# Patient Record
Sex: MALE | URBAN - METROPOLITAN AREA
[De-identification: names, ages, dates, MRNs, and addresses within clinical notes are randomized per-mention and may not be internally consistent; named-entity substitution may affect disease eponyms.]

---

## 2017-06-15 ENCOUNTER — EMERGENCY (EMERGENCY)
Facility: HOSPITAL | Age: 57
LOS: 1 days | End: 2017-06-15
Payer: COMMERCIAL

## 2017-06-15 ENCOUNTER — INPATIENT (INPATIENT)
Facility: HOSPITAL | Age: 57
LOS: 6 days | Discharge: HOME | DRG: 25 | End: 2017-06-22
Attending: EMERGENCY MEDICINE | Admitting: EMERGENCY MEDICINE
Payer: COMMERCIAL

## 2017-06-15 VITALS
OXYGEN SATURATION: 97 % | DIASTOLIC BLOOD PRESSURE: 81 MMHG | RESPIRATION RATE: 12 BRPM | SYSTOLIC BLOOD PRESSURE: 131 MMHG | HEART RATE: 68 BPM

## 2017-06-15 DIAGNOSIS — D49.6 NEOPLASM OF UNSPECIFIED BEHAVIOR OF BRAIN: ICD-10-CM

## 2017-06-15 LAB
ALBUMIN SERPL ELPH-MCNC: 4 G/DL — SIGNIFICANT CHANGE UP (ref 3.3–5)
ALP SERPL-CCNC: 46 U/L — SIGNIFICANT CHANGE UP (ref 40–120)
ALT FLD-CCNC: 25 U/L RC — SIGNIFICANT CHANGE UP (ref 10–45)
ANION GAP SERPL CALC-SCNC: 15 MMOL/L — SIGNIFICANT CHANGE UP (ref 5–17)
APTT BLD: 27.1 SEC — LOW (ref 27.5–37.4)
AST SERPL-CCNC: 19 U/L — SIGNIFICANT CHANGE UP (ref 10–40)
BASOPHILS # BLD AUTO: 0 K/UL — SIGNIFICANT CHANGE UP (ref 0–0.2)
BASOPHILS NFR BLD AUTO: 0 % — SIGNIFICANT CHANGE UP (ref 0–2)
BILIRUB SERPL-MCNC: 0.6 MG/DL — SIGNIFICANT CHANGE UP (ref 0.2–1.2)
BLD GP AB SCN SERPL QL: NEGATIVE — SIGNIFICANT CHANGE UP
BUN SERPL-MCNC: 17 MG/DL — SIGNIFICANT CHANGE UP (ref 7–23)
CALCIUM SERPL-MCNC: 9 MG/DL — SIGNIFICANT CHANGE UP (ref 8.4–10.5)
CHLORIDE SERPL-SCNC: 108 MMOL/L — SIGNIFICANT CHANGE UP (ref 96–108)
CO2 SERPL-SCNC: 22 MMOL/L — SIGNIFICANT CHANGE UP (ref 22–31)
CREAT SERPL-MCNC: 0.93 MG/DL — SIGNIFICANT CHANGE UP (ref 0.5–1.3)
EOSINOPHIL # BLD AUTO: 0 K/UL — SIGNIFICANT CHANGE UP (ref 0–0.5)
EOSINOPHIL NFR BLD AUTO: 0.2 % — SIGNIFICANT CHANGE UP (ref 0–6)
GLUCOSE SERPL-MCNC: 140 MG/DL — HIGH (ref 70–99)
HCT VFR BLD CALC: 43.2 % — SIGNIFICANT CHANGE UP (ref 39–50)
HGB BLD-MCNC: 15.1 G/DL — SIGNIFICANT CHANGE UP (ref 13–17)
INR BLD: 1.1 RATIO — SIGNIFICANT CHANGE UP (ref 0.88–1.16)
LYMPHOCYTES # BLD AUTO: 1 K/UL — SIGNIFICANT CHANGE UP (ref 1–3.3)
LYMPHOCYTES # BLD AUTO: 13.1 % — SIGNIFICANT CHANGE UP (ref 13–44)
MCHC RBC-ENTMCNC: 31.2 PG — SIGNIFICANT CHANGE UP (ref 27–34)
MCHC RBC-ENTMCNC: 34.8 GM/DL — SIGNIFICANT CHANGE UP (ref 32–36)
MCV RBC AUTO: 89.6 FL — SIGNIFICANT CHANGE UP (ref 80–100)
MONOCYTES # BLD AUTO: 0.1 K/UL — SIGNIFICANT CHANGE UP (ref 0–0.9)
MONOCYTES NFR BLD AUTO: 1.9 % — LOW (ref 2–14)
NEUTROPHILS # BLD AUTO: 6.5 K/UL — SIGNIFICANT CHANGE UP (ref 1.8–7.4)
NEUTROPHILS NFR BLD AUTO: 84.7 % — HIGH (ref 43–77)
PLATELET # BLD AUTO: 255 K/UL — SIGNIFICANT CHANGE UP (ref 150–400)
POTASSIUM SERPL-MCNC: 4.1 MMOL/L — SIGNIFICANT CHANGE UP (ref 3.5–5.3)
POTASSIUM SERPL-SCNC: 4.1 MMOL/L — SIGNIFICANT CHANGE UP (ref 3.5–5.3)
PROT SERPL-MCNC: 7.1 G/DL — SIGNIFICANT CHANGE UP (ref 6–8.3)
PROTHROM AB SERPL-ACNC: 11.9 SEC — SIGNIFICANT CHANGE UP (ref 9.8–12.7)
RBC # BLD: 4.83 M/UL — SIGNIFICANT CHANGE UP (ref 4.2–5.8)
RBC # FLD: 12 % — SIGNIFICANT CHANGE UP (ref 10.3–14.5)
RH IG SCN BLD-IMP: POSITIVE — SIGNIFICANT CHANGE UP
SODIUM SERPL-SCNC: 145 MMOL/L — SIGNIFICANT CHANGE UP (ref 135–145)
WBC # BLD: 7.7 K/UL — SIGNIFICANT CHANGE UP (ref 3.8–10.5)
WBC # FLD AUTO: 7.7 K/UL — SIGNIFICANT CHANGE UP (ref 3.8–10.5)

## 2017-06-15 PROCEDURE — 74177 CT ABD & PELVIS W/CONTRAST: CPT | Mod: 26

## 2017-06-15 PROCEDURE — 71260 CT THORAX DX C+: CPT | Mod: 26

## 2017-06-15 PROCEDURE — 99285 EMERGENCY DEPT VISIT HI MDM: CPT | Mod: 25

## 2017-06-15 PROCEDURE — 70450 CT HEAD/BRAIN W/O DYE: CPT | Mod: 26

## 2017-06-15 PROCEDURE — 93010 ELECTROCARDIOGRAM REPORT: CPT | Mod: NC

## 2017-06-15 PROCEDURE — 99285 EMERGENCY DEPT VISIT HI MDM: CPT

## 2017-06-15 PROCEDURE — 71020: CPT | Mod: 26

## 2017-06-15 RX ORDER — ACETAMINOPHEN 500 MG
650 TABLET ORAL EVERY 6 HOURS
Qty: 0 | Refills: 0 | Status: DISCONTINUED | OUTPATIENT
Start: 2017-06-15 | End: 2017-06-19

## 2017-06-15 RX ORDER — ONDANSETRON 8 MG/1
4 TABLET, FILM COATED ORAL EVERY 6 HOURS
Qty: 0 | Refills: 0 | Status: DISCONTINUED | OUTPATIENT
Start: 2017-06-15 | End: 2017-06-19

## 2017-06-15 RX ORDER — DOCUSATE SODIUM 100 MG
100 CAPSULE ORAL THREE TIMES A DAY
Qty: 0 | Refills: 0 | Status: DISCONTINUED | OUTPATIENT
Start: 2017-06-15 | End: 2017-06-19

## 2017-06-15 RX ORDER — SENNA PLUS 8.6 MG/1
2 TABLET ORAL AT BEDTIME
Qty: 0 | Refills: 0 | Status: DISCONTINUED | OUTPATIENT
Start: 2017-06-15 | End: 2017-06-19

## 2017-06-15 RX ORDER — LEVETIRACETAM 250 MG/1
500 TABLET, FILM COATED ORAL EVERY 12 HOURS
Qty: 0 | Refills: 0 | Status: DISCONTINUED | OUTPATIENT
Start: 2017-06-15 | End: 2017-06-19

## 2017-06-15 RX ADMIN — LEVETIRACETAM 500 MILLIGRAM(S): 250 TABLET, FILM COATED ORAL at 23:06

## 2017-06-15 NOTE — ED PROVIDER NOTE - PHYSICAL EXAMINATION
NAD, NCAT, MMM, Trachea midline, Normal conjunctiva, CTAB, Non-tachycardic, Normal perfusion, Soft, NTND, No rebound/guarding, No edema, No deformity of extremities, Appropriate, Cooperative, With capacity and insight, No rashes,     CN 2-12 intact, normal coordination, normal finger to nose, normal heel to shin, Romberg, no pronator drift, gait instability, 5/5 strength in upper and lower extremities, normal sensory throughout, alert and oriented to person, place, time, and situation. NAD, NCAT, MMM, Trachea midline, Normal conjunctiva, CTAB, Non-tachycardic, Normal perfusion, Soft, NTND, No rebound/guarding, No edema, No deformity of extremities, Appropriate, Cooperative, With capacity and insight, No rashes, CN 2-12 intact, normal coordination, normal finger to nose, normal heel to shin, no pronator drift, no gait tested, Patient stands without difficulty, 5/5 strength in upper and lower extremities, normal sensory throughout, alert and oriented to person, place, time, and situation.     CN 2-12 intact, normal coordination, normal finger to nose, normal heel to shin, Romberg, no pronator drift, gait instability, 5/5 strength in upper and lower extremities, normal sensory throughout, alert and oriented to person, place, time, and situation.

## 2017-06-15 NOTE — ED ADULT NURSE NOTE - OBJECTIVE STATEMENT
57yr male transferred from [facility] with wife and daughter for AMS.  Pt came with 20g IV in LAC from [facility] and received 10mg decadron.  Pt was brought into hospital for confusion, wife reports this has been going on for a few weeks, went to hospital had CT scan and was diagnosed with R frontal mass.  Pt has a hx Crohn's Disease had small bowel resection 20yr ago. Pt presents a&o2, oriented to name and place, PERR, full ROM all extremities, no muscular deficits, no ha, no visual deficits, no tactile deficits, family reports Pt responds with inappropriate comments to questions and conversation, skin warm dry & intact. Pt transferred for Neuro Consult. 57yr male transferred from [facility] with wife and daughter for AMS.  Pt came with 20g IV in LAC from [facility] and received 10mg decadron.  Pt was brought into hospital for confusion, wife reports this has been going on for a few weeks, went to hospital had CT scan and was diagnosed with R frontal mass.  Pt has a hx Crohn's Disease had small bowel resection 20yr ago. Pt presents a&o2, oriented to name and place, PERR, full ROM all extremities, no muscular deficits, no ha, no visual deficits, no tactile deficits, family reports Pt responds with inappropriate comments to questions and conversation, has short term memory loss and personality change, skin warm dry & intact. Pt transferred for Neuro Consult.

## 2017-06-15 NOTE — ED PROVIDER NOTE - PROGRESS NOTE DETAILS
neurosurgery to follow MRI/A and CT c/a/p neurosurgery to follow MRI/A and CT c/a/p and request admission to their service, hold steroids per the neurosurgery team as may interfere with biopsy results if patient suffering with lymphoma

## 2017-06-15 NOTE — ED PROVIDER NOTE - OBJECTIVE STATEMENT
58 y/o male with PSHx of Small Bowel Resection presents to the ED c/o worsening short term memory loss and personality changes. Per family, pt has rapidly become forgetful. Reports he went to PMD who ordered routine blood work which was unremarkable. Per family, he has had inappropriate response to family member's passing. 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 Per family, pt has appointment with neurologist next month. Denies fever, chills, n/v/d.

## 2017-06-15 NOTE — H&P ADULT - HISTORY OF PRESENT ILLNESS
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 His symptoms seemed to worsen in the past few days, and he was let go from his job due to forgetfulness this week. He denies any HA, vision change, nausea/vomiting, focal weakness, seizrues, and gait abnormality and is full strength on exam w/ intact speech and repetition.     Exam  AAOx3, EOS, FC  Fluent speech w/ intact naming and repetition; unable to recall any of three objects after short interval  PERRL, EOMI  Face symmetric, tongue midline  JACQUES 5/5, no drift  SILT throughout

## 2017-06-15 NOTE — ED PROVIDER NOTE - MEDICAL DECISION MAKING DETAILS
Patient with ams and + new brain tumor. Will get iv, labs, mri/a head, will ct c/a/p and admit to neurosurgery

## 2017-06-15 NOTE — H&P ADULT - PROBLEM SELECTOR PLAN 1
admit to floor  q4 neuro checks  MRI brain +/-  CT CAP  keppra 500 BID  plan for OR pending imaging results

## 2017-06-15 NOTE — ED ADULT NURSE NOTE - CHPI ED SYMPTOMS NEG
no nausea/no fever/no weakness/no loss of consciousness/no blurred vision/no numbness/no change in level of consciousness/no vomiting

## 2017-06-16 DIAGNOSIS — G93.9 DISORDER OF BRAIN, UNSPECIFIED: ICD-10-CM

## 2017-06-16 DIAGNOSIS — E07.89 OTHER SPECIFIED DISORDERS OF THYROID: ICD-10-CM

## 2017-06-16 DIAGNOSIS — K76.0 FATTY (CHANGE OF) LIVER, NOT ELSEWHERE CLASSIFIED: ICD-10-CM

## 2017-06-16 DIAGNOSIS — K52.9 NONINFECTIVE GASTROENTERITIS AND COLITIS, UNSPECIFIED: ICD-10-CM

## 2017-06-16 DIAGNOSIS — T18.9XXA FOREIGN BODY OF ALIMENTARY TRACT, PART UNSPECIFIED, INITIAL ENCOUNTER: ICD-10-CM

## 2017-06-16 PROCEDURE — 74000: CPT | Mod: 26

## 2017-06-16 PROCEDURE — 70552 MRI BRAIN STEM W/DYE: CPT | Mod: 26

## 2017-06-16 PROCEDURE — 99223 1ST HOSP IP/OBS HIGH 75: CPT

## 2017-06-16 RX ORDER — CALCIUM CARBONATE 500(1250)
1 TABLET ORAL ONCE
Qty: 0 | Refills: 0 | Status: COMPLETED | OUTPATIENT
Start: 2017-06-16 | End: 2017-06-16

## 2017-06-16 RX ORDER — ENOXAPARIN SODIUM 100 MG/ML
40 INJECTION SUBCUTANEOUS AT BEDTIME
Qty: 0 | Refills: 0 | Status: DISCONTINUED | OUTPATIENT
Start: 2017-06-16 | End: 2017-06-18

## 2017-06-16 RX ADMIN — LEVETIRACETAM 500 MILLIGRAM(S): 250 TABLET, FILM COATED ORAL at 10:03

## 2017-06-16 RX ADMIN — Medication 100 MILLIGRAM(S): at 15:54

## 2017-06-16 RX ADMIN — Medication 100 MILLIGRAM(S): at 21:33

## 2017-06-16 RX ADMIN — Medication 1 TABLET(S): at 12:28

## 2017-06-16 RX ADMIN — LEVETIRACETAM 500 MILLIGRAM(S): 250 TABLET, FILM COATED ORAL at 21:33

## 2017-06-16 NOTE — PROGRESS NOTE ADULT - SUBJECTIVE AND OBJECTIVE BOX
SUBJECTIVE: Amb independently in hallway. No complaints    OVERNIGHT EVENTS: None    Vital Signs Last 24 Hrs  T(C): 36.4, Max: 36.8 (06-15 @ 19:00)  T(F): 97.5, Max: 98.3 (06-15 @ 19:00)  HR: 75 (65 - 84)  BP: 127/87 (120/76 - 153/71)  BP(mean): --  RR: 18 (12 - 18)  SpO2: 99% (95% - 99%)  IVF: [X ] IVL [ ] NS+K@   DIET: (X] Regular [ ] CCD [ ] Renal [ ] Puree [ ] Dysphagia [ ] Tube Feeds:   PCA: [ ] YES [X ] NO   ANNA: [ ] YES [X ] NO [ ] VOID   BM: [ ] YES [ ] NO     DRAINS: N/A    PHYSICAL EXAM:    General: No Acute Distress     Neurological: Awake. 0/3 recall. Able to name 3/3 object correctly. Speech clear. Following Commands, PERRL, EOMI, Face Symmetrical, Moving all extremities 5/5, Muscle Strength normal in all four extremities, No Drift, Sensation to Light Touch Intact    Pulmonary: Clear to Auscultation, No Rales, No Rhonchi, No Wheezes     Cardiovascular: S1, S2, Regular Rate and Rhythm     Gastrointestinal: Soft, Nontender, Nondistended     Extremities: No calf tenderness     Incision: N/A    LABS:                        15.1   7.7   )-----------( 255      ( 15 Osmar 2017 20:54 )             43.2    -15    145  |  108  |  17  ----------------------------<  140<H>  4.1   |  22  |  0.93    Ca    9.0      15 Osmar 2017 20:54    TPro  7.1  /  Alb  4.0  /  TBili  0.6  /  DBili  x   /  AST  19  /  ALT  25  /  AlkPhos  46  -15  PT/INR - ( 15 Osmar 2017 20:54 )   PT: 11.9 sec;   INR: 1.10 ratio         PTT - ( 15 Osmar 2017 20:54 )  PTT:27.1 sec      IMAGIN/15 CT CAP: No evidence of intrathoracic or intra-abdominal malignancy.    MEDICATIONS  (STANDING):  levETIRAcetam 500milliGRAM(s) Oral every 12 hours  docusate sodium 100milliGRAM(s) Oral three times a day    MEDICATIONS  (PRN):  acetaminophen   Tablet 650milliGRAM(s) Oral every 6 hours PRN For Temp greater than 38 C (100.4 F)  acetaminophen   Tablet. 650milliGRAM(s) Oral every 6 hours PRN Mild Pain (1 - 3)  ondansetron Injectable 4milliGRAM(s) IV Push every 6 hours PRN Nausea and/or Vomiting  senna 2Tablet(s) Oral at bedtime PRN Constipation

## 2017-06-16 NOTE — CONSULT NOTE ADULT - PROBLEM SELECTOR RECOMMENDATION 5
subcentimeter hypodense focus in left lobe of thyroid gland  recent TFT reportedly normal  outpatient follow up with possible thyroid U/S.

## 2017-06-16 NOTE — CONSULT NOTE ADULT - SUBJECTIVE AND OBJECTIVE BOX
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 Per spouse, patient has had changed in mental status for the past 10 months or so and was let go from his job. Patient's symptoms were getting progressively worse in the last couple of weeks and his spouse became concerned about his safety which prompted her to bring him to ER. Patient denies HA, changes in vision, dizziness, chest pain, SOB, ab pain, n/v, changes in bowel or urination.     PMD: Dr. Saud Stanley      PAST MEDICAL & SURGICAL HISTORY:      Review of Systems:   CONSTITUTIONAL: No fever, weight loss, or fatigue  HEENT: No sore throat, vision changes  RESPIRATORY: No cough, wheezing, shortness of breath  CARDIOVASCULAR: No chest pain, palpitations, leg edema  GASTROINTESTINAL: No abdominal pain. No nausea, vomiting, diarrhea or constipation. No melena or hematochezia.  GENITOURINARY: No dysuria, frequency, hematuria  NEUROLOGICAL: No headaches, memory loss, loss of strength, numbness, or tremors  SKIN: No itching, burning, rashes, or lesions   MUSCULOSKELETAL: No joint pain or swelling; No muscle, back, or extremity pain  PSYCHIATRIC: No depression, anxiety  HEME/LYMPH: No easy bruising, or bleeding gums      Allergies    No Known Allergies    Intolerances        Social History:     FAMILY HISTORY:      MEDICATIONS  (STANDING):  levETIRAcetam 500milliGRAM(s) Oral every 12 hours  docusate sodium 100milliGRAM(s) Oral three times a day  enoxaparin Injectable 40milliGRAM(s) SubCutaneous at bedtime    MEDICATIONS  (PRN):  acetaminophen   Tablet 650milliGRAM(s) Oral every 6 hours PRN For Temp greater than 38 C (100.4 F)  acetaminophen   Tablet. 650milliGRAM(s) Oral every 6 hours PRN Mild Pain (1 - 3)  ondansetron Injectable 4milliGRAM(s) IV Push every 6 hours PRN Nausea and/or Vomiting  senna 2Tablet(s) Oral at bedtime PRN Constipation      Vital Signs Last 24 Hrs  T(C): 36.8, Max: 36.8 (06-15 @ 19:00)  HR: 67 (65 - 84)  BP: 131/76 (120/76 - 153/71)  RR: 18 (12 - 18)  SpO2: 99% (95% - 99%)  Wt(kg): --  CAPILLARY BLOOD GLUCOSE    I&O's Summary    I & Os for current day (as of 16 Jun 2017 14:02)  =============================================  IN: 240 ml / OUT: 0 ml / NET: 240 ml      PHYSICAL EXAM:  GENERAL: NAD  HEENT: neck supple  LUNG: Clear to auscultation bilaterally; No wheeze  HEART: Regular rate and rhythm; No murmurs  ABDOMEN: Soft, Nontender, Nondistended; Bowel sounds present  EXTREMITIES: No leg edema  PSYCH: normal affect, calm  NEUROLOGY: AAO x3   SKIN: No rashes or lesions    LABS:                        15.1   7.7   )-----------( 255      ( 15 Osmar 2017 20:54 )             43.2     06-15    145  |  108  |  17  ----------------------------<  140<H>  4.1   |  22  |  0.93    Ca    9.0      15 Osmar 2017 20:54    TPro  7.1  /  Alb  4.0  /  TBili  0.6  /  DBili  x   /  AST  19  /  ALT  25  /  AlkPhos  46  06-15    PT/INR - ( 15 Osmar 2017 20:54 )   PT: 11.9 sec;   INR: 1.10 ratio         PTT - ( 15 Osmar 2017 20:54 )  PTT:27.1 sec          RADIOLOGY & ADDITIONAL TESTS:    Imaging Personally Reviewed:    Consultant(s) Notes Reviewed:      Care Discussed with Consultants/Other Providers: HPI:  57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 Per spouse, patient has had changed in mental status for the past 10 months or so and was let go from his job. Patient's symptoms were getting progressively worse in the last couple of weeks and his spouse became concerned about his safety which prompted her to bring him to ER. Patient denies HA, changes in vision, dizziness, weakness, numbness, chest pain, SOB, ab pain, n/v, changes in bowel or urination.     PMD: Dr. Saud Stanley      PAST MEDICAL & SURGICAL HISTORY:  IBD(Crohn's) s/p small bowel resection  GERD    Review of Systems:   CONSTITUTIONAL: No fever, weight loss, or fatigue  HEENT: No sore throat, vision changes  RESPIRATORY: No cough, wheezing, shortness of breath  CARDIOVASCULAR: No chest pain, palpitations, leg edema  GASTROINTESTINAL: No abdominal pain. No nausea, vomiting, diarrhea or constipation. No melena or hematochezia. + intermittent heartburn relieved with TUMS  GENITOURINARY: No dysuria, frequency, hematuria  NEUROLOGICAL: No headaches, memory loss, loss of strength, numbness, or tremors  SKIN: No itching, burning, rashes, or lesions   MUSCULOSKELETAL: No joint pain or swelling; No muscle, back, or extremity pain  PSYCHIATRIC: No depression, anxiety  HEME/LYMPH: No easy bruising, or bleeding gums      Allergies    No Known Allergies    Intolerances        Social History: no tobacco, EtOH , drug use                                                FAMILY HISTORY: noncontributory      MEDICATIONS  (STANDING):  levETIRAcetam 500milliGRAM(s) Oral every 12 hours  docusate sodium 100milliGRAM(s) Oral three times a day  enoxaparin Injectable 40milliGRAM(s) SubCutaneous at bedtime    MEDICATIONS  (PRN):  acetaminophen   Tablet 650milliGRAM(s) Oral every 6 hours PRN For Temp greater than 38 C (100.4 F)  acetaminophen   Tablet. 650milliGRAM(s) Oral every 6 hours PRN Mild Pain (1 - 3)  ondansetron Injectable 4milliGRAM(s) IV Push every 6 hours PRN Nausea and/or Vomiting  senna 2Tablet(s) Oral at bedtime PRN Constipation    HOME MEDS:  vitamin B12 sublingual   iron supplement  TUMS prn    Vital Signs Last 24 Hrs  T(C): 36.8, Max: 36.8 (06-15 @ 19:00)  HR: 67 (65 - 84)  BP: 131/76 (120/76 - 153/71)  RR: 18 (12 - 18)  SpO2: 99% (95% - 99%) on RA  Wt(kg): --  CAPILLARY BLOOD GLUCOSE    I&O's Summary    I & Os for current day (as of 16 Jun 2017 14:02)  =============================================  IN: 240 ml / OUT: 0 ml / NET: 240 ml      PHYSICAL EXAM:  GENERAL: NAD  HEENT: neck supple  LUNG: Clear to auscultation bilaterally; No wheeze  HEART: Regular rate and rhythm; No murmurs  ABDOMEN: Soft, Nontender, Nondistended; Bowel sounds present  EXTREMITIES: No leg edema  PSYCH: normal affect, calm  NEUROLOGY: AAO x3(person, place, month and year), moves all extremities  SKIN: No rashes     LABS:                        15.1   7.7   )-----------( 255      ( 15 Osmar 2017 20:54 )             43.2     06-15    145  |  108  |  17  ----------------------------<  140<H>  4.1   |  22  |  0.93    Ca    9.0      15 Osmar 2017 20:54    TPro  7.1  /  Alb  4.0  /  TBili  0.6  /  DBili  x   /  AST  19  /  ALT  25  /  AlkPhos  46  06-15    PT/INR - ( 15 Osmar 2017 20:54 )   PT: 11.9 sec;   INR: 1.10 ratio         PTT - ( 15 Osmar 2017 20:54 )  PTT:27.1 sec          RADIOLOGY & ADDITIONAL TESTS:    Imaging Personally Reviewed:    CT C/A/P:    FINDINGS:    CHEST:     LUNGS AND LARGE AIRWAYS: Patent central airways. Bibasilar subsegmental   atelectasis.  PLEURA: No pleural effusion.  VESSELS: Within normal limits.  HEART: Heart size is normal. No pericardial effusion.  MEDIASTINUM AND JONATHON: No lymphadenopathy.  CHEST WALL AND LOWER NECK: Subcentimeter hypodense focus within the left   lobe of thyroid gland.    ABDOMEN AND PELVIS:    LIVER: Diffuse hepatic steatosis.  BILE DUCTS: Normal caliber.  GALLBLADDER: Within normal limits.  SPLEEN: Within normal limits.  PANCREAS: Within normal limits.  ADRENALS: Within normal limits.  KIDNEYS/URETERS: Symmetrically enhancing. No hydronephrosis or   nephrolithiasis. Hypodense, subcentimeter focus at the interpolar region   of the left kidney is too small to characterize.    BLADDER: Within normal limits.  REPRODUCTIVE ORGANS: The prostate is unremarkable.    BOWEL: Ovoid, radiopaque object within the small bowel measuring 1.9 x   0.9 cm, likely representing an ingested foreign body. No bowel   obstruction. Ileocolic resection.  PERITONEUM: No ascites.  VESSELS:  Atherosclerotic calcifications.  RETROPERITONEUM: No lymphadenopathy.    ABDOMINAL WALL: Small fat-containing left inguinal hernia.  BONES: Minimal multilevel spondylosis.    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 Per spouse, patient has had change in mental status for the past 10 months or so and was let go from his job. Patient's symptoms were getting progressively worse in the last couple of weeks and his spouse became concerned about his safety which prompted her to bring him to ER. Patient denies HA, changes in vision, dizziness, weakness, numbness, chest pain, SOB, ab pain, n/v, changes in bowel or urination. Per spouse, patient recently had blood work with PMD including Vit B12, TFT and lyme titers which were all normal.     PMD: Dr. Saud Stanley      PAST MEDICAL & SURGICAL HISTORY:  IBD(Crohn's) s/p small bowel resection  GERD    Review of Systems:   CONSTITUTIONAL: No fever, weight loss, or fatigue  HEENT: No sore throat, vision changes  RESPIRATORY: No cough, wheezing, shortness of breath  CARDIOVASCULAR: No chest pain, palpitations, leg edema  GASTROINTESTINAL: No abdominal pain. No nausea, vomiting, diarrhea or constipation. No melena or hematochezia. + intermittent heartburn relieved with TUMS  GENITOURINARY: No dysuria, frequency, hematuria  NEUROLOGICAL: No headaches, memory loss, loss of strength, numbness, or tremors  SKIN: No itching, burning, rashes, or lesions   MUSCULOSKELETAL: No joint pain or swelling; No muscle, back, or extremity pain  PSYCHIATRIC: No depression, anxiety  HEME/LYMPH: No easy bruising, or bleeding gums      Allergies    No Known Allergies    Intolerances        Social History: no tobacco, EtOH , drug use                                                FAMILY HISTORY: noncontributory      MEDICATIONS  (STANDING):  levETIRAcetam 500milliGRAM(s) Oral every 12 hours  docusate sodium 100milliGRAM(s) Oral three times a day  enoxaparin Injectable 40milliGRAM(s) SubCutaneous at bedtime    MEDICATIONS  (PRN):  acetaminophen   Tablet 650milliGRAM(s) Oral every 6 hours PRN For Temp greater than 38 C (100.4 F)  acetaminophen   Tablet. 650milliGRAM(s) Oral every 6 hours PRN Mild Pain (1 - 3)  ondansetron Injectable 4milliGRAM(s) IV Push every 6 hours PRN Nausea and/or Vomiting  senna 2Tablet(s) Oral at bedtime PRN Constipation    HOME MEDS:  vitamin B12 sublingual   iron supplement  TUMS prn    Vital Signs Last 24 Hrs  T(C): 36.8, Max: 36.8 (06-15 @ 19:00)  HR: 67 (65 - 84)  BP: 131/76 (120/76 - 153/71)  RR: 18 (12 - 18)  SpO2: 99% (95% - 99%) on RA  Wt(kg): --  CAPILLARY BLOOD GLUCOSE    I&O's Summary    I & Os for current day (as of 16 Jun 2017 14:02)  =============================================  IN: 240 ml / OUT: 0 ml / NET: 240 ml      PHYSICAL EXAM:  GENERAL: NAD  HEENT: neck supple  LUNG: Clear to auscultation bilaterally; No wheeze  HEART: Regular rate and rhythm; No murmurs  ABDOMEN: Soft, Nontender, Nondistended; Bowel sounds present  EXTREMITIES: No leg edema  PSYCH: normal affect, calm  NEUROLOGY: AAO x3(person, place, month and year), moves all extremities  SKIN: No rashes     LABS:                        15.1   7.7   )-----------( 255      ( 15 Osmar 2017 20:54 )             43.2     06-15    145  |  108  |  17  ----------------------------<  140<H>  4.1   |  22  |  0.93    Ca    9.0      15 Osmar 2017 20:54    TPro  7.1  /  Alb  4.0  /  TBili  0.6  /  DBili  x   /  AST  19  /  ALT  25  /  AlkPhos  46  06-15    PT/INR - ( 15 Osmar 2017 20:54 )   PT: 11.9 sec;   INR: 1.10 ratio         PTT - ( 15 Osmar 2017 20:54 )  PTT:27.1 sec          RADIOLOGY & ADDITIONAL TESTS:    EKG tracing reviewed: NSR @75bpm      CT head:  Mass in the right and anterior/inferior frontal lobe with mild vasogenic  edema and regional mass effect , further followup MRI with contrast is  suggested for better assessment.    No CT evidence of an acute infarction, parenchymal hematoma or extra axial  collections.    CT C/A/P:    FINDINGS:    CHEST:     LUNGS AND LARGE AIRWAYS: Patent central airways. Bibasilar subsegmental   atelectasis.  PLEURA: No pleural effusion.  VESSELS: Within normal limits.  HEART: Heart size is normal. No pericardial effusion.  MEDIASTINUM AND JONATHON: No lymphadenopathy.  CHEST WALL AND LOWER NECK: Subcentimeter hypodense focus within the left   lobe of thyroid gland.    ABDOMEN AND PELVIS:    LIVER: Diffuse hepatic steatosis.  BILE DUCTS: Normal caliber.  GALLBLADDER: Within normal limits.  SPLEEN: Within normal limits.  PANCREAS: Within normal limits.  ADRENALS: Within normal limits.  KIDNEYS/URETERS: Symmetrically enhancing. No hydronephrosis or   nephrolithiasis. Hypodense, subcentimeter focus at the interpolar region   of the left kidney is too small to characterize.    BLADDER: Within normal limits.  REPRODUCTIVE ORGANS: The prostate is unremarkable.    BOWEL: Ovoid, radiopaque object within the small bowel measuring 1.9 x   0.9 cm, likely representing an ingested foreign body. No bowel   obstruction. Ileocolic resection.  PERITONEUM: No ascites.  VESSELS:  Atherosclerotic calcifications.  RETROPERITONEUM: No lymphadenopathy.    ABDOMINAL WALL: Small fat-containing left inguinal hernia.  BONES: Minimal multilevel spondylosis.    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 Per spouse, patient has had change in mental status for the past 10 months or so and was let go from his job. Patient's symptoms were getting progressively worse in the last couple of weeks and his spouse became concerned about his safety which prompted her to bring him to ER. Patient denies HA, changes in vision, dizziness, weakness, numbness. No chest pain or  SOB with exertion. No ab pain, n/v, changes in bowel or urination. Per spouse, patient recently had blood work with PMD including Vit B12, TFT and lyme titers which were all normal.     PMD: Dr. Saud Stanley      PAST MEDICAL & SURGICAL HISTORY:  IBD(Crohn's) s/p small bowel resection  GERD    Review of Systems:   CONSTITUTIONAL: No fever, weight loss, or fatigue  HEENT: No sore throat, vision changes  RESPIRATORY: No cough, wheezing, shortness of breath  CARDIOVASCULAR: No chest pain, palpitations, leg edema  GASTROINTESTINAL: No abdominal pain. No nausea, vomiting, diarrhea or constipation. No melena or hematochezia. + intermittent heartburn relieved with TUMS  GENITOURINARY: No dysuria, frequency, hematuria  NEUROLOGICAL: No headaches, memory loss, loss of strength, numbness, or tremors  SKIN: No itching, burning, rashes, or lesions   MUSCULOSKELETAL: No joint pain or swelling; No muscle, back, or extremity pain  PSYCHIATRIC: No depression, anxiety  HEME/LYMPH: No easy bruising, or bleeding gums      Allergies    No Known Allergies    Intolerances        Social History: no tobacco, EtOH , drug use                                                FAMILY HISTORY: noncontributory      MEDICATIONS  (STANDING):  levETIRAcetam 500milliGRAM(s) Oral every 12 hours  docusate sodium 100milliGRAM(s) Oral three times a day  enoxaparin Injectable 40milliGRAM(s) SubCutaneous at bedtime    MEDICATIONS  (PRN):  acetaminophen   Tablet 650milliGRAM(s) Oral every 6 hours PRN For Temp greater than 38 C (100.4 F)  acetaminophen   Tablet. 650milliGRAM(s) Oral every 6 hours PRN Mild Pain (1 - 3)  ondansetron Injectable 4milliGRAM(s) IV Push every 6 hours PRN Nausea and/or Vomiting  senna 2Tablet(s) Oral at bedtime PRN Constipation    HOME MEDS:  vitamin B12 sublingual   iron supplement  TUMS prn    Vital Signs Last 24 Hrs  T(C): 36.8, Max: 36.8 (06-15 @ 19:00)  HR: 67 (65 - 84)  BP: 131/76 (120/76 - 153/71)  RR: 18 (12 - 18)  SpO2: 99% (95% - 99%) on RA  Wt(kg): --  CAPILLARY BLOOD GLUCOSE    I&O's Summary    I & Os for current day (as of 16 Jun 2017 14:02)  =============================================  IN: 240 ml / OUT: 0 ml / NET: 240 ml      PHYSICAL EXAM:  GENERAL: NAD  HEENT: neck supple  LUNG: Clear to auscultation bilaterally; No wheeze  HEART: Regular rate and rhythm; No murmurs  ABDOMEN: Soft, Nontender, Nondistended; Bowel sounds present  EXTREMITIES: No leg edema  PSYCH: normal affect, calm  NEUROLOGY: AAO x3(person, place, month and year), moves all extremities  SKIN: No rashes     LABS:                        15.1   7.7   )-----------( 255      ( 15 Osmar 2017 20:54 )             43.2     06-15    145  |  108  |  17  ----------------------------<  140<H>  4.1   |  22  |  0.93    Ca    9.0      15 Osmar 2017 20:54    TPro  7.1  /  Alb  4.0  /  TBili  0.6  /  DBili  x   /  AST  19  /  ALT  25  /  AlkPhos  46  06-15    PT/INR - ( 15 Osmar 2017 20:54 )   PT: 11.9 sec;   INR: 1.10 ratio         PTT - ( 15 Osmar 2017 20:54 )  PTT:27.1 sec          RADIOLOGY & ADDITIONAL TESTS:    EKG tracing reviewed: NSR @75bpm      CT head:  Mass in the right and anterior/inferior frontal lobe with mild vasogenic  edema and regional mass effect , further followup MRI with contrast is  suggested for better assessment.    No CT evidence of an acute infarction, parenchymal hematoma or extra axial  collections.    CT C/A/P:    FINDINGS:    CHEST:     LUNGS AND LARGE AIRWAYS: Patent central airways. Bibasilar subsegmental   atelectasis.  PLEURA: No pleural effusion.  VESSELS: Within normal limits.  HEART: Heart size is normal. No pericardial effusion.  MEDIASTINUM AND JONATHON: No lymphadenopathy.  CHEST WALL AND LOWER NECK: Subcentimeter hypodense focus within the left   lobe of thyroid gland.    ABDOMEN AND PELVIS:    LIVER: Diffuse hepatic steatosis.  BILE DUCTS: Normal caliber.  GALLBLADDER: Within normal limits.  SPLEEN: Within normal limits.  PANCREAS: Within normal limits.  ADRENALS: Within normal limits.  KIDNEYS/URETERS: Symmetrically enhancing. No hydronephrosis or   nephrolithiasis. Hypodense, subcentimeter focus at the interpolar region   of the left kidney is too small to characterize.    BLADDER: Within normal limits.  REPRODUCTIVE ORGANS: The prostate is unremarkable.    BOWEL: Ovoid, radiopaque object within the small bowel measuring 1.9 x   0.9 cm, likely representing an ingested foreign body. No bowel   obstruction. Ileocolic resection.  PERITONEUM: No ascites.  VESSELS:  Atherosclerotic calcifications.  RETROPERITONEUM: No lymphadenopathy.    ABDOMINAL WALL: Small fat-containing left inguinal hernia.  BONES: Minimal multilevel spondylosis.    Consultant(s) Notes Reviewed:      Care Discussed with Consultants/Other Providers: neurosurgery

## 2017-06-16 NOTE — PROGRESS NOTE ADULT - ASSESSMENT
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 His symptoms seemed to worsen in the past few days, and he was let go from his job due to forgetfulness this week. He denies any HA, vision change, nausea/vomiting, focal weakness, seizrues, and gait abnormality and is full strength on exam w/ intact speech and repetition.     PROCEDURE:  Adm 6/15 Rt Front PF Mass  POD# N/A    PLAN:   Neuro: MRI Brain w/wo-p for 2pm today.  Start SQL, Cont Keppra. Monitor off Decadron. Start Reg diet. Hosp/Med clearance called-FU.       DVT ppx: [X ] SQL [ ] SQH and Venodynes [ ] Left [ ] Right [ X] Bilateral    Discharge planning: PT/OT-p for safety

## 2017-06-16 NOTE — CONSULT NOTE ADULT - PROBLEM SELECTOR RECOMMENDATION 9
plan for MRI brain per neurosugery to decide further plan of care. no definitive plan for OR at this time. plan for MRI brain per neurosugery to decide further plan of care. no definitive plan for OR now but no medical contraindication for planned procedure should patient require neurosurgical intervention at this time. plan for MRI brain per neurosugery to decide further plan of care. No medical contraindication for planned procedure should patient require neurosurgical intervention at this time.

## 2017-06-16 NOTE — CONSULT NOTE ADULT - ASSESSMENT
57M with hx IBD s/p small bowel resection, GERD, with progressively worsening confusion, forgetfulness, short term memory found to have right frontal brain mass with vasogenic edema on CT head. Patient also noted to have foreign body in small bowel on CT a/p.

## 2017-06-16 NOTE — CONSULT NOTE ADULT - PROBLEM SELECTOR RECOMMENDATION 2
spoke to radiology regarding "Ovoid, radiopaque object within the small bowel measuring 1.9 x 0.9 cm, likely representing an ingested foreign body."- unclear exactly the nature of the object. patient denies ingesting any foreign object or trauma.  surgery consult to be called- d/w neurosurgery PA

## 2017-06-16 NOTE — CONSULT NOTE ADULT - PROBLEM SELECTOR RECOMMENDATION 4
s/p small bowel resection in the remote past and has not had any issues  on vitamin B12 sublingual and iron supplements at home

## 2017-06-17 LAB
BLD GP AB SCN SERPL QL: NEGATIVE — SIGNIFICANT CHANGE UP
RH IG SCN BLD-IMP: POSITIVE — SIGNIFICANT CHANGE UP

## 2017-06-17 PROCEDURE — 74000: CPT | Mod: 26

## 2017-06-17 RX ADMIN — LEVETIRACETAM 500 MILLIGRAM(S): 250 TABLET, FILM COATED ORAL at 11:02

## 2017-06-17 RX ADMIN — Medication 100 MILLIGRAM(S): at 15:02

## 2017-06-17 RX ADMIN — Medication 100 MILLIGRAM(S): at 21:01

## 2017-06-17 RX ADMIN — Medication 100 MILLIGRAM(S): at 06:17

## 2017-06-17 RX ADMIN — LEVETIRACETAM 500 MILLIGRAM(S): 250 TABLET, FILM COATED ORAL at 21:01

## 2017-06-17 RX ADMIN — ENOXAPARIN SODIUM 40 MILLIGRAM(S): 100 INJECTION SUBCUTANEOUS at 21:04

## 2017-06-17 NOTE — PROGRESS NOTE ADULT - SUBJECTIVE AND OBJECTIVE BOX
SUBJECTIVE: No complaints. Facial redness yesterday-improved.    OVERNIGHT EVENTS: None    Vital Signs Last 24 Hrs  T(C): 36.4, Max: 36.8 ( @ 11:56)  T(F): 97.6, Max: 98.2 ( @ 11:56)  HR: 66 (66 - 67)  BP: 133/87 (124/79 - 144/91)  BP(mean): --  RR: 18 (18 - 18)  SpO2: 96% (96% - 99%)  IVF: [X ] IVL [ ] NS+K@   DIET: [ X] Regular [ ] CCD [ ] Renal [ ] Puree [ ] Dysphagia [ ] Tube Feeds:   PCA: n/a  ANNA: [ ] YES [ ] NO [X ] VOID   BM: [ ] YES [ X] NO     DRAINS: n/a    PHYSICAL EXAM:    General: No Acute Distress     Neurological: Awake, Pleasantly confused. A&O x 0, but able to name 3/3 object correctly. place and time, Following Commands, PERRL, EOMI, Face Symmetrical, Speech clear and appropriate, Moving all extremities, Muscle Strength normal in all four extremities, No Drift, Sensation to Light Touch Intact    Pulmonary: Clear to Auscultation, No Rales, No Rhonchi, No Wheezes     Cardiovascular: S1, S2, Regular Rate and Rhythm     Gastrointestinal: Soft, Nontender, Nondistended     Extremities: No calf tenderness     Incision: n/a    LABS:                        15.1   7.7   )-----------( 255      ( 15 Osmar 2017 20:54 )             43.2    -15    145  |  108  |  17  ----------------------------<  140<H>  4.1   |  22  |  0.93    Ca    9.0      15 Osmar 2017 20:54    TPro  7.1  /  Alb  4.0  /  TBili  0.6  /  DBili  x   /  AST  19  /  ALT  25  /  AlkPhos  46  06-15  PT/INR - ( 15 Osmar 2017 20:54 )   PT: 11.9 sec;   INR: 1.10 ratio         PTT - ( 15 Osmar 2017 20:54 )  PTT:27.1 sec      I & Os for current day (as of  @ 09:00)  =============================================  IN: 837 ml / OUT: 0 ml / NET: 837 ml    IMAGIN/16 MRI: A large infiltrating and partially necrotic mass involves both frontal lobes and corpus callosum with enhancing and nonenhancing components as   described. Leading considerations include glioblastoma multiforme versus CNS lymphoma.    MEDICATIONS  (STANDING):  levETIRAcetam 500milliGRAM(s) Oral every 12 hours  docusate sodium 100milliGRAM(s) Oral three times a day  enoxaparin Injectable 40milliGRAM(s) SubCutaneous at bedtime    MEDICATIONS  (PRN):  acetaminophen   Tablet 650milliGRAM(s) Oral every 6 hours PRN For Temp greater than 38 C (100.4 F)  acetaminophen   Tablet. 650milliGRAM(s) Oral every 6 hours PRN Mild Pain (1 - 3)  ondansetron Injectable 4milliGRAM(s) IV Push every 6 hours PRN Nausea and/or Vomiting  senna 2Tablet(s) Oral at bedtime PRN Constipation

## 2017-06-17 NOTE — PROGRESS NOTE ADULT - SUBJECTIVE AND OBJECTIVE BOX
TRAUMA SURGERY DAILY PROGRESS NOTE:     EXAM:  ABD-PORTABLE URGENT                            PROCEDURE DATE:  06/17/2017            INTERPRETATION:  CLINICAL INDICATION: Evaluate the position of foreign   body.    TECHNIQUE: portable views of the abdomen and pelvis were obtained.   Comparison was made with the prior study dated 6/16/2017.    FINDINGS:    There is nonobstructive bowel gas pattern. The evaluation for free   intra-abdominal air is suboptimal on this portable view. There are   surgical change sutures in the projection of the right lower quadrant.   Previously reported form body is no longer visualized. There are pelvic   phleboliths.    IMPRESSION:    The previously reported form body is no longer visualized.        a/p:58 yo M with previously seen foreign body/  - foreign no longer visualized  - reconsult surgery as needed.

## 2017-06-17 NOTE — CONSULT NOTE ADULT - ATTENDING COMMENTS
Agree with A/P. Follow foreign body with xrays. As of last xray foreign body not visualized. No surgical intervention required.
d/w patient and spouse

## 2017-06-17 NOTE — PROGRESS NOTE ADULT - ASSESSMENT
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 His symptoms seemed to worsen in the past few days, and he was let go from his job due to forgetfulness this week. He denies any HA, vision change, nausea/vomiting, focal weakness, seizrues, and gait abnormality and is full strength on exam w/ intact speech and repetition.     PROCEDURE:  PROCEDURE:  Adm 6/15 Rt Front PF Mass  POD# N/A    PLAN:   Neuro: Will not start Decadron as possibilty of Lymphoma, cont  SQL, Keppra.  FU Dr Nieves if to start Decadron and further plans for surgery.  Med/Hosp-No medical contraindication for planned procedure should patient require neurosurgical intervention at this time.plan for MRI brain per neurosugery to decide further plan of care. no definitive plan for OR now but no medical contraindication for planned procedure should patient require neurosurgical intervention at this time..  Surgery/Dr Estrada saw 6/16 ([id number])-Abxr AP done to correlate position of FB with CT Abd-FU rpt, FU xray in 2 days to see if FB passed into colon. 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 His symptoms seemed to worsen in the past few days, and he was let go from his job due to forgetfulness this week. He denies any HA, vision change, nausea/vomiting, focal weakness, seizrues, and gait abnormality and is full strength on exam w/ intact speech and repetition.     PROCEDURE:  PROCEDURE:  Adm 6/15 Rt Front PF Mass  POD# N/A    PLAN:   Neuro: Will not start Decadron as possibilty of Lymphoma, cont  SQL, Keppra.  FU Dr Cuba if to start Decadron and further plans for surgery.  Med/Hosp-No medical contraindication for planned procedure should patient require neurosurgical intervention at this time.plan for MRI brain per neurosugery to decide further plan of care. no definitive plan for OR now but no medical contraindication for planned procedure should patient require neurosurgical intervention at this time..  Surgery/Dr Estrada saw 6/16 ([id number])-Abxr AP done to correlate position of FB with CT Abd-FU rpt, FU xray in 2 days to see if FB passed into colon. No note seen-FU  Discharge planning: PT/OT cons-p

## 2017-06-17 NOTE — CONSULT NOTE ADULT - SUBJECTIVE AND OBJECTIVE BOX
GENERAL SURGERY CONSULT NOTE  --------------------------------------------------------------------------------------------    Patient is a 57y old  Male who presents with a chief complaint of Confusion w/ memory loss and R frontal mass on CTH (15 Osmar 2017 21:31)      HPI:  Patient is a 56 yo R handed male who p/w 2-3 months of intermittent confusion and short term memory loss, that became progressively worse in the last two weeks. The patient was  found to  have R frontal parafalcine mass seen on CTH w/ surrounding vasogenic edema and mass effect. Patient underwent CT of chest abdomen and pelvis as part of metastatic work-up and was found to have radio-opaque foreign object within the small bowel. Patient denies any obstructive symptoms, no nausea, no vomiting, no fever or chills    ROS: 10-system review is otherwise negative except HPI above.      ALLERGIES: No Known Allergies      CURRENT MEDICATIONS  MEDICATIONS (STANDING): levETIRAcetam 500milliGRAM(s) Oral every 12 hours  docusate sodium 100milliGRAM(s) Oral three times a day  enoxaparin Injectable 40milliGRAM(s) SubCutaneous at bedtime    MEDICATIONS (PRN):acetaminophen   Tablet 650milliGRAM(s) Oral every 6 hours PRN For Temp greater than 38 C (100.4 F)  acetaminophen   Tablet. 650milliGRAM(s) Oral every 6 hours PRN Mild Pain (1 - 3)  ondansetron Injectable 4milliGRAM(s) IV Push every 6 hours PRN Nausea and/or Vomiting  senna 2Tablet(s) Oral at bedtime PRN Constipation    --------------------------------------------------------------------------------------------    Vitals:   T(C): 36.4, Max: 36.8 (06-16 @ 11:56)  HR: 66 (66 - 67)  BP: 133/87 (124/79 - 144/91)  BP(mean): --  RR: 18 (18 - 18)  SpO2: 96% (96% - 99%)  Wt(kg): --  CAPILLARY BLOOD GLUCOSE    CAPILLARY BLOOD GLUCOSE      I & Os for current day (as of 06-17 @ 10:46)  =============================================  IN:    Oral Fluid: 837 ml    Total IN: 837 ml  ---------------------------------------------  OUT:    Total OUT: 0 ml  ---------------------------------------------  Total NET: 837 ml    Height (cm): 170.2 (06-15 @ 22:31)  Weight (kg): 80.6 (06-15 @ 22:31)  BMI (kg/m2): 27.8 (06-15 @ 22:31)  BSA (m2): 1.92 (06-15 @ 22:31)    PHYSICAL EXAM:  General: NAD, Lying in bed comfortably  Neuro: A+Ox3  HEENT: NC/AT, EOMI  Neck: Soft, supple  Cardio: RRR, nml S1/S2  Resp: Good effort, CTA b/l  Thorax: No chest wall tenderness  Breast: No lesions/masses, no drainage  GI/Abd: Soft, NT/ND, no rebound/guarding, no masses palpated  Vascular: All 4 extremities warm.    BOWEL: Ovoid, radiopaque object within the small bowel measuring 1.9 x   0.9 cm, likely representing an ingested foreign body. No bowel   obstruction. Ileocolic resection.  PERITONEUM: No ascites.  VESSELS:  Atherosclerotic calcifications.  RETROPERITONEUM: No lymphadenopathy.    ABDOMINAL WALL: Small fat-containing left inguinal hernia.  BONES: Minimal multilevel spondylosis.    IMPRESSION:     No evidence of intrathoracic or intra-abdominal malignancy.  ASSESSMENT: Patient is a 57y old m with R parafalcine tumor, found to have incidental foreign body seen on CT, asymptomatic    PLAN:    Obtain AXR to evaluate current position of foreign body  F/U with repeat AXR until foreign body has passed  No acute surgical intervention  Discussed with Dr. Estrada

## 2017-06-18 LAB
ANION GAP SERPL CALC-SCNC: 20 MMOL/L — HIGH (ref 5–17)
BUN SERPL-MCNC: 17 MG/DL — SIGNIFICANT CHANGE UP (ref 7–23)
CALCIUM SERPL-MCNC: 9.1 MG/DL — SIGNIFICANT CHANGE UP (ref 8.4–10.5)
CHLORIDE SERPL-SCNC: 107 MMOL/L — SIGNIFICANT CHANGE UP (ref 96–108)
CO2 SERPL-SCNC: 17 MMOL/L — LOW (ref 22–31)
CREAT SERPL-MCNC: 0.93 MG/DL — SIGNIFICANT CHANGE UP (ref 0.5–1.3)
GLUCOSE SERPL-MCNC: 85 MG/DL — SIGNIFICANT CHANGE UP (ref 70–99)
HCT VFR BLD CALC: 45.7 % — SIGNIFICANT CHANGE UP (ref 39–50)
HGB BLD-MCNC: 15.1 G/DL — SIGNIFICANT CHANGE UP (ref 13–17)
MCHC RBC-ENTMCNC: 28.9 PG — SIGNIFICANT CHANGE UP (ref 27–34)
MCHC RBC-ENTMCNC: 33 GM/DL — SIGNIFICANT CHANGE UP (ref 32–36)
MCV RBC AUTO: 87.5 FL — SIGNIFICANT CHANGE UP (ref 80–100)
PLATELET # BLD AUTO: 268 K/UL — SIGNIFICANT CHANGE UP (ref 150–400)
POTASSIUM SERPL-MCNC: 3.9 MMOL/L — SIGNIFICANT CHANGE UP (ref 3.5–5.3)
POTASSIUM SERPL-SCNC: 3.9 MMOL/L — SIGNIFICANT CHANGE UP (ref 3.5–5.3)
RBC # BLD: 5.22 M/UL — SIGNIFICANT CHANGE UP (ref 4.2–5.8)
RBC # FLD: 13.2 % — SIGNIFICANT CHANGE UP (ref 10.3–14.5)
SODIUM SERPL-SCNC: 144 MMOL/L — SIGNIFICANT CHANGE UP (ref 135–145)
WBC # BLD: 8.45 K/UL — SIGNIFICANT CHANGE UP (ref 3.8–10.5)
WBC # FLD AUTO: 8.45 K/UL — SIGNIFICANT CHANGE UP (ref 3.8–10.5)

## 2017-06-18 RX ORDER — DEXTROSE MONOHYDRATE, SODIUM CHLORIDE, AND POTASSIUM CHLORIDE 50; .745; 4.5 G/1000ML; G/1000ML; G/1000ML
1000 INJECTION, SOLUTION INTRAVENOUS
Qty: 0 | Refills: 0 | Status: DISCONTINUED | OUTPATIENT
Start: 2017-06-18 | End: 2017-06-19

## 2017-06-18 RX ADMIN — LEVETIRACETAM 500 MILLIGRAM(S): 250 TABLET, FILM COATED ORAL at 21:06

## 2017-06-18 RX ADMIN — LEVETIRACETAM 500 MILLIGRAM(S): 250 TABLET, FILM COATED ORAL at 11:30

## 2017-06-18 NOTE — PROGRESS NOTE ADULT - PROBLEM SELECTOR PLAN 1
-NPO after midnight -NPO after midnight  -Discussed procedure in detail, including risk/benefits/alternatives. All questions answered. -NPO after midnight  -Discussed risk/benefits/alternatives of surgery and answered all questions.

## 2017-06-18 NOTE — PROGRESS NOTE ADULT - SUBJECTIVE AND OBJECTIVE BOX
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     Exam:   AAOx2, PERRL, EOMI, no facial  JACQUES 5/5, no drift  SILT    T(C): 36.6, Max: 36.9 (06-17 @ 23:16)  HR: 76 (69 - 76)  BP: 108/69 (104/66 - 123/79)  RR: 18 (16 - 18)  SpO2: 97% (96% - 98%)  Wt(kg): --    06-18    144  |  107  |  17  ----------------------------<  85  3.9   |  17<L>  |  0.93    Ca    9.1      18 Jun 2017 07:11      CBC Full  -  ( 18 Jun 2017 07:15 )  WBC Count : 8.45 K/uL  Hemoglobin : 15.1 g/dL  Hematocrit : 45.7 %  Platelet Count - Automated : 268 K/uL        Pregnancy test: n/a    Type & Screen (in past 72hrs): done    Medical clearance: done    Consent: done PRE-OP NOTE  Procedure: Left frontal biopsy of brain mass  Attending: Bereket    58 yo R handed M who p/w 2-3 months of intermittent confusion and short term memory loss who has a R frontal mass with extension into corpus callosum and L frontal pole on MRI.    Exam:   AAOx2, PERRL, EOMI, no facial  JACQUES 5/5, no drift  SILT    T(C): 36.6, Max: 36.9 (06-17 @ 23:16)  HR: 76 (69 - 76)  BP: 108/69 (104/66 - 123/79)  RR: 18 (16 - 18)  SpO2: 97% (96% - 98%)  Wt(kg): --    06-18    144  |  107  |  17  ----------------------------<  85  3.9   |  17<L>  |  0.93    Ca    9.1      18 Jun 2017 07:11      CBC Full  -  ( 18 Jun 2017 07:15 )  WBC Count : 8.45 K/uL  Hemoglobin : 15.1 g/dL  Hematocrit : 45.7 %  Platelet Count - Automated : 268 K/uL        Pregnancy test: n/a    Type & Screen (in past 72hrs): done    Medical clearance: done    Consent: done

## 2017-06-18 NOTE — PHYSICAL THERAPY INITIAL EVALUATION ADULT - LIVES WITH, PROFILE
spouse/Prior to arrival patient lives in a house with wife and step son, independent with adl's and does not use any DME. Patient has 4 steps to enter and bedroom on second floor with availability on first. spouse/Prior to arrival patient lives in a house with wife and step son, independent with adl's and does not use any DME. Patient has 4 steps with [employer] (wide) to enter and bedroom on second floor with availability on first./children

## 2017-06-18 NOTE — PROGRESS NOTE ADULT - SUBJECTIVE AND OBJECTIVE BOX
SUBJECTIVE: No complaints    OVERNIGHT EVENTS: None    Vital Signs Last 24 Hrs  T(C): 36.6, Max: 36.9 ( @ 23:16)  T(F): 97.8, Max: 98.4 ( @ 23:16)  HR: 76 (69 - 76)  BP: 108/69 (104/66 - 123/79)  BP(mean): --  RR: 18 (16 - 18)  SpO2: 97% (95% - 98%)  IVF: [ X] IVL [ ] NS+K@   DIET: [X ] Regular [ ] CCD [ ] Renal [ ] Puree [ ] Dysphagia [ ] Tube Feeds:   PCA: [ ] YES [X ] NO   ANNA: [ ] YES [ ] NO [X ] VOID   BM: [X ] YES on  pm     DRAINS: n/a    PHYSICAL EXAM:    General: No Acute Distress     Neurological: Awake, Pleasantly confused. A&O to year occasionaly, able to name 3/3 object correctly. Following Commands, PERRL, EOMI, Face Symmetrical, Speech clear and appropriate, Moving all extremities, Muscle Strength normal in all four extremities, No Drift, Sensation to Light Touch Intact    Pulmonary: Clear to Auscultation, No Rales, No Rhonchi, No Wheezes     Cardiovascular: S1, S2, Regular Rate and Rhythm     Gastrointestinal: Soft, Nontender, Nondistended     Extremities: No calf tenderness     Incision: none    LABS:                        15.1   8.45  )-----------( 268      ( 2017 07:15 )             45.7    -    144  |  107  |  17  ----------------------------<  85  3.9   |  17<L>  |  0.93    Ca    9.1      2017 07:11    6/15 PT/PTT/INR-11.9/27.1/1.01     Type + Screen (17 @ 17:08) ABO Interpretation: O, Rh Interpretation: Positive, Antibody Screen: Negative    6/15 ECG:  NORMAL SINUS RHYTHM 75bpm. . NORMAL ECG        I & Os for current day (as of  @ 08:24)  =============================================  IN: 1020 ml / OUT: 0 ml / NET: 1020 ml    IMAGIN/15 CT Chest: No evidence of intrathoracic or intra-abdominal malignancy.    MEDICATIONS  (STANDING):  levETIRAcetam 500milliGRAM(s) Oral every 12 hours  docusate sodium 100milliGRAM(s) Oral three times a day  enoxaparin Injectable 40milliGRAM(s) SubCutaneous at bedtime    MEDICATIONS  (PRN):  acetaminophen   Tablet 650milliGRAM(s) Oral every 6 hours PRN For Temp greater than 38 C (100.4 F)  acetaminophen   Tablet. 650milliGRAM(s) Oral every 6 hours PRN Mild Pain (1 - 3)  ondansetron Injectable 4milliGRAM(s) IV Push every 6 hours PRN Nausea and/or Vomiting  senna 2Tablet(s) Oral at bedtime PRN Constipation

## 2017-06-18 NOTE — PROGRESS NOTE ADULT - ASSESSMENT
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 His symptoms seemed to worsen in the past few days, and he was let go from his job due to forgetfulness this week. He denies any HA, vision change, nausea/vomiting, focal weakness, seizrues, and gait abnormality and is full strength on exam w/ intact speech and repetition.     PROCEDURE:  Adm 6/15 Rt Front PF Mass  POD# n/a    Neuro:  Preop for Lt frontal Brain Biopsy 6/19. Will not start Decadron as possibilty of Lymphoma, cont Keppra, Hold AC for OR 6/19.  IVF, NPO P MN.  Med/Hosp-No medical contraindication for planned procedure should patient require neurosurgical intervention at this time.plan for MRI brain per neurosugery to decide further plan of care. no definitive plan for OR now but no medical contraindication for planned procedure should patient require neurosurgical intervention at this time..  Surgery/Dr Estrada saw 6/16 ([id number])-The previously seen foreign body no longer visualized. reconsult surgery as needed.   Discharge planning: PT/OT cons-p  Respiratory: Patient instructed to use incentive spirometer [ ] YES [X ] NO              DVT ppx: [ X] SQL-held for OR 6/19 [ ] SQH and Venodynes [ ] Left [ ] Right [ ] Bilateral     Discharge planning: PT/OT FU postop

## 2017-06-19 ENCOUNTER — RESULT REVIEW (OUTPATIENT)
Age: 57
End: 2017-06-19

## 2017-06-19 ENCOUNTER — TRANSCRIPTION ENCOUNTER (OUTPATIENT)
Age: 57
End: 2017-06-19

## 2017-06-19 LAB
ANION GAP SERPL CALC-SCNC: 17 MMOL/L — SIGNIFICANT CHANGE UP (ref 5–17)
BUN SERPL-MCNC: 14 MG/DL — SIGNIFICANT CHANGE UP (ref 7–23)
CALCIUM SERPL-MCNC: 8.7 MG/DL — SIGNIFICANT CHANGE UP (ref 8.4–10.5)
CHLORIDE SERPL-SCNC: 105 MMOL/L — SIGNIFICANT CHANGE UP (ref 96–108)
CO2 SERPL-SCNC: 22 MMOL/L — SIGNIFICANT CHANGE UP (ref 22–31)
CREAT SERPL-MCNC: 1.01 MG/DL — SIGNIFICANT CHANGE UP (ref 0.5–1.3)
GLUCOSE SERPL-MCNC: 122 MG/DL — HIGH (ref 70–99)
HCT VFR BLD CALC: 46.3 % — SIGNIFICANT CHANGE UP (ref 39–50)
HGB BLD-MCNC: 16.2 G/DL — SIGNIFICANT CHANGE UP (ref 13–17)
MAGNESIUM SERPL-MCNC: 2.3 MG/DL — SIGNIFICANT CHANGE UP (ref 1.6–2.6)
MCHC RBC-ENTMCNC: 31.2 PG — SIGNIFICANT CHANGE UP (ref 27–34)
MCHC RBC-ENTMCNC: 35 GM/DL — SIGNIFICANT CHANGE UP (ref 32–36)
MCV RBC AUTO: 89.2 FL — SIGNIFICANT CHANGE UP (ref 80–100)
PHOSPHATE SERPL-MCNC: 3.8 MG/DL — SIGNIFICANT CHANGE UP (ref 2.5–4.5)
PLATELET # BLD AUTO: 264 K/UL — SIGNIFICANT CHANGE UP (ref 150–400)
POTASSIUM SERPL-MCNC: 4.1 MMOL/L — SIGNIFICANT CHANGE UP (ref 3.5–5.3)
POTASSIUM SERPL-SCNC: 4.1 MMOL/L — SIGNIFICANT CHANGE UP (ref 3.5–5.3)
RBC # BLD: 5.19 M/UL — SIGNIFICANT CHANGE UP (ref 4.2–5.8)
RBC # FLD: 12.4 % — SIGNIFICANT CHANGE UP (ref 10.3–14.5)
SODIUM SERPL-SCNC: 144 MMOL/L — SIGNIFICANT CHANGE UP (ref 135–145)
WBC # BLD: 11.5 K/UL — HIGH (ref 3.8–10.5)
WBC # FLD AUTO: 11.5 K/UL — HIGH (ref 3.8–10.5)

## 2017-06-19 PROCEDURE — 88342 IMHCHEM/IMCYTCHM 1ST ANTB: CPT | Mod: 26,59

## 2017-06-19 PROCEDURE — 61750 INCISE SKULL/BRAIN BIOPSY: CPT

## 2017-06-19 PROCEDURE — 88360 TUMOR IMMUNOHISTOCHEM/MANUAL: CPT | Mod: 26

## 2017-06-19 PROCEDURE — 88334 PATH CONSLTJ SURG CYTO XM EA: CPT | Mod: 26,59

## 2017-06-19 PROCEDURE — 88307 TISSUE EXAM BY PATHOLOGIST: CPT | Mod: 26

## 2017-06-19 PROCEDURE — 88331 PATH CONSLTJ SURG 1 BLK 1SPC: CPT | Mod: 26

## 2017-06-19 PROCEDURE — 88341 IMHCHEM/IMCYTCHM EA ADD ANTB: CPT | Mod: 26,59

## 2017-06-19 RX ORDER — PANTOPRAZOLE SODIUM 20 MG/1
40 TABLET, DELAYED RELEASE ORAL DAILY
Qty: 0 | Refills: 0 | Status: DISCONTINUED | OUTPATIENT
Start: 2017-06-19 | End: 2017-06-22

## 2017-06-19 RX ORDER — NAFCILLIN 10 G/100ML
INJECTION, POWDER, FOR SOLUTION INTRAVENOUS
Qty: 0 | Refills: 0 | Status: DISCONTINUED | OUTPATIENT
Start: 2017-06-19 | End: 2017-06-21

## 2017-06-19 RX ORDER — MORPHINE SULFATE 50 MG/1
2 CAPSULE, EXTENDED RELEASE ORAL EVERY 4 HOURS
Qty: 0 | Refills: 0 | Status: DISCONTINUED | OUTPATIENT
Start: 2017-06-19 | End: 2017-06-21

## 2017-06-19 RX ORDER — DOCUSATE SODIUM 100 MG
100 CAPSULE ORAL THREE TIMES A DAY
Qty: 0 | Refills: 0 | Status: DISCONTINUED | OUTPATIENT
Start: 2017-06-19 | End: 2017-06-22

## 2017-06-19 RX ORDER — ONDANSETRON 8 MG/1
4 TABLET, FILM COATED ORAL ONCE
Qty: 0 | Refills: 0 | Status: DISCONTINUED | OUTPATIENT
Start: 2017-06-19 | End: 2017-06-20

## 2017-06-19 RX ORDER — DEXAMETHASONE 0.5 MG/5ML
4 ELIXIR ORAL EVERY 6 HOURS
Qty: 0 | Refills: 0 | Status: DISCONTINUED | OUTPATIENT
Start: 2017-06-19 | End: 2017-06-20

## 2017-06-19 RX ORDER — DEXTROSE MONOHYDRATE, SODIUM CHLORIDE, AND POTASSIUM CHLORIDE 50; .745; 4.5 G/1000ML; G/1000ML; G/1000ML
1000 INJECTION, SOLUTION INTRAVENOUS
Qty: 0 | Refills: 0 | Status: DISCONTINUED | OUTPATIENT
Start: 2017-06-19 | End: 2017-06-20

## 2017-06-19 RX ORDER — NAFCILLIN 10 G/100ML
1 INJECTION, POWDER, FOR SOLUTION INTRAVENOUS ONCE
Qty: 0 | Refills: 0 | Status: COMPLETED | OUTPATIENT
Start: 2017-06-19 | End: 2017-06-19

## 2017-06-19 RX ORDER — ACETAMINOPHEN 500 MG
650 TABLET ORAL EVERY 6 HOURS
Qty: 0 | Refills: 0 | Status: DISCONTINUED | OUTPATIENT
Start: 2017-06-19 | End: 2017-06-22

## 2017-06-19 RX ORDER — ENOXAPARIN SODIUM 100 MG/ML
40 INJECTION SUBCUTANEOUS AT BEDTIME
Qty: 0 | Refills: 0 | Status: DISCONTINUED | OUTPATIENT
Start: 2017-06-20 | End: 2017-06-20

## 2017-06-19 RX ORDER — HYDROMORPHONE HYDROCHLORIDE 2 MG/ML
0.5 INJECTION INTRAMUSCULAR; INTRAVENOUS; SUBCUTANEOUS
Qty: 0 | Refills: 0 | Status: DISCONTINUED | OUTPATIENT
Start: 2017-06-19 | End: 2017-06-20

## 2017-06-19 RX ORDER — NAFCILLIN 10 G/100ML
1 INJECTION, POWDER, FOR SOLUTION INTRAVENOUS EVERY 4 HOURS
Qty: 0 | Refills: 0 | Status: DISCONTINUED | OUTPATIENT
Start: 2017-06-20 | End: 2017-06-21

## 2017-06-19 RX ORDER — LEVETIRACETAM 250 MG/1
500 TABLET, FILM COATED ORAL EVERY 12 HOURS
Qty: 0 | Refills: 0 | Status: DISCONTINUED | OUTPATIENT
Start: 2017-06-19 | End: 2017-06-22

## 2017-06-19 RX ORDER — ONDANSETRON 8 MG/1
4 TABLET, FILM COATED ORAL EVERY 6 HOURS
Qty: 0 | Refills: 0 | Status: DISCONTINUED | OUTPATIENT
Start: 2017-06-19 | End: 2017-06-22

## 2017-06-19 RX ORDER — SENNA PLUS 8.6 MG/1
2 TABLET ORAL AT BEDTIME
Qty: 0 | Refills: 0 | Status: DISCONTINUED | OUTPATIENT
Start: 2017-06-19 | End: 2017-06-22

## 2017-06-19 RX ADMIN — Medication 4 MILLIGRAM(S): at 23:50

## 2017-06-19 RX ADMIN — DEXTROSE MONOHYDRATE, SODIUM CHLORIDE, AND POTASSIUM CHLORIDE 75 MILLILITER(S): 50; .745; 4.5 INJECTION, SOLUTION INTRAVENOUS at 00:00

## 2017-06-19 RX ADMIN — NAFCILLIN 100 GRAM(S): 10 INJECTION, POWDER, FOR SOLUTION INTRAVENOUS at 21:45

## 2017-06-19 RX ADMIN — DEXTROSE MONOHYDRATE, SODIUM CHLORIDE, AND POTASSIUM CHLORIDE 80 MILLILITER(S): 50; .745; 4.5 INJECTION, SOLUTION INTRAVENOUS at 23:45

## 2017-06-19 RX ADMIN — LEVETIRACETAM 500 MILLIGRAM(S): 250 TABLET, FILM COATED ORAL at 11:48

## 2017-06-20 PROCEDURE — 70450 CT HEAD/BRAIN W/O DYE: CPT | Mod: 26

## 2017-06-20 PROCEDURE — 70450 CT HEAD/BRAIN W/O DYE: CPT | Mod: 26,77

## 2017-06-20 PROCEDURE — 99233 SBSQ HOSP IP/OBS HIGH 50: CPT

## 2017-06-20 RX ORDER — DEXAMETHASONE 0.5 MG/5ML
4 ELIXIR ORAL EVERY 6 HOURS
Qty: 0 | Refills: 0 | Status: DISCONTINUED | OUTPATIENT
Start: 2017-06-20 | End: 2017-06-22

## 2017-06-20 RX ADMIN — Medication 4 MILLIGRAM(S): at 11:28

## 2017-06-20 RX ADMIN — NAFCILLIN 100 GRAM(S): 10 INJECTION, POWDER, FOR SOLUTION INTRAVENOUS at 05:26

## 2017-06-20 RX ADMIN — NAFCILLIN 100 GRAM(S): 10 INJECTION, POWDER, FOR SOLUTION INTRAVENOUS at 14:30

## 2017-06-20 RX ADMIN — NAFCILLIN 100 GRAM(S): 10 INJECTION, POWDER, FOR SOLUTION INTRAVENOUS at 02:09

## 2017-06-20 RX ADMIN — Medication 4 MILLIGRAM(S): at 17:28

## 2017-06-20 RX ADMIN — LEVETIRACETAM 500 MILLIGRAM(S): 250 TABLET, FILM COATED ORAL at 17:29

## 2017-06-20 RX ADMIN — LEVETIRACETAM 500 MILLIGRAM(S): 250 TABLET, FILM COATED ORAL at 05:25

## 2017-06-20 RX ADMIN — NAFCILLIN 100 GRAM(S): 10 INJECTION, POWDER, FOR SOLUTION INTRAVENOUS at 18:33

## 2017-06-20 RX ADMIN — Medication 100 MILLIGRAM(S): at 05:26

## 2017-06-20 RX ADMIN — NAFCILLIN 100 GRAM(S): 10 INJECTION, POWDER, FOR SOLUTION INTRAVENOUS at 21:59

## 2017-06-20 RX ADMIN — PANTOPRAZOLE SODIUM 40 MILLIGRAM(S): 20 TABLET, DELAYED RELEASE ORAL at 11:29

## 2017-06-20 RX ADMIN — NAFCILLIN 100 GRAM(S): 10 INJECTION, POWDER, FOR SOLUTION INTRAVENOUS at 10:03

## 2017-06-20 RX ADMIN — Medication 4 MILLIGRAM(S): at 05:26

## 2017-06-20 NOTE — PROGRESS NOTE ADULT - ASSESSMENT
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 Neuroloically at preoperative baseline. CT shows small right frontal hemorrhage and post operative changes.   Cont decadron, Keppra. Pt/OT  Increase activity  DC rodney  Will check CT tonight  Hospitalist to follow on floor  I discussed with Dr Cuba    [] Patient is at high risk of neurologic deterioration/death due to:     Time seen:0830am  Time spent: ___ [] critical care minutes ASSESSMENT/PLAN:  POD 1 from left frontal betsey hole and biopsy of lesion-frozen c/w high grade glioma. Neuroloically at preoperative baseline. CT shows small right frontal hemorrhage and post operative changes.   Cont decadron, Keppra. Pt/OT  Increase activity  DC rodney  Will check CT [facility]  Hospitalist to follow on floor  Hold lovenox for now until follow-up CT  I discussed with Dr Cuba    [] Patient is at high risk of neurologic deterioration/death due to:     Time seen:0830am  Time spent: ___ [] critical care minutes

## 2017-06-20 NOTE — PROVIDER CONTACT NOTE (OTHER) - ACTION/TREATMENT ORDERED:
constant observation ordered & periop assistant & pts. family @ bedside to observe pt.
No interventions at this time. Continue to monitor.

## 2017-06-20 NOTE — PROVIDER CONTACT NOTE (OTHER) - SITUATION
pt. slightly restless & picking @ tape for IV, trying to unlock wheels on chair to "move chair around", trying to get up & walk around on his own.

## 2017-06-20 NOTE — PROGRESS NOTE ADULT - SUBJECTIVE AND OBJECTIVE BOX
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 His symptoms seemed to worsen in the past few days, and he was let go from his job due to forgetfulness this week. He denies any HA, vision change, nausea/vomiting, focal weakness, seizrues, and gait abnormality and is full strength on exam w/ intact speech and repetition.     Exam  AAOx3, EOS, FC  Fluent speech w/ intact naming and repetition; unable to recall any of three objects after short interval  PERRL, EOMI  Face symmetric, tongue midline  JACQUES 5/5, no drift  SILT throughout (15 Osmar 2017 21:31)    SURGERY: [place] hole craniectomy for brain biopsy    PAST MEDICAL HISTORY:   PAST SURGICAL HISTORY:   FAMILY HISTORY:    ALLERGIES: No Known Allergies    **************************************  **************************************    OVERNIGHT EVENTS: [x] None    ROS  Unobtainable due to mental status[x] Negative []  Positives:    ADMISSION SCORES: GCS: HH: MF: NIHSS: RASS: CAM-ICU: ICP:    ICU Vital Signs Last 24 Hrs  T(C): 36, Max: 36.6 (06-20 @ 00:00)  T(F): 96.8, Max: 97.9 (06-20 @ 00:00)  HR: 88 (68 - 88)  BP: 121/79 (111/63 - 140/85)  BP(mean): 98 (82 - 107)  ABP: --  ABP(mean): --  RR: 16 (14 - 18)  SpO2: 98% (94% - 99%)   I & Os for 24h ending 06-20 @ 07:00  =============================================  IN: 1090 ml / OUT: 1400 ml / NET: -310 ml    I & Os for current day (as of 06-20 @ 09:43)  =============================================  IN: 80 ml / OUT: 250 ml / NET: -170 ml         DEVICES: [] Restraints [] REVA/HMV []LD [] ET tube [] Trach [] Chest Tube [x] A-line [x] Villatoro [] NGT [] Rectal Tube [] EVD [] CVL  [] ICP/LiCOx    NEUROIMAGING: Small right frontal IPH; post operative changes    EEG REPORT:     MEDICATIONS:  HYDROmorphone  Injectable 0.5milliGRAM(s) IV Push every 10 minutes PRN  promethazine IVPB 6.25milliGRAM(s) IV Intermittent once PRN  sodium chloride 0.9% with potassium chloride 20 mEq/L 1000milliLiter(s) IV Continuous <Continuous>  acetaminophen   Tablet 650milliGRAM(s) Oral every 6 hours PRN  acetaminophen   Tablet. 650milliGRAM(s) Oral every 6 hours PRN  oxyCODONE  5 mG/acetaminophen 325 mG 1Tablet(s) Oral every 4 hours PRN  oxyCODONE  5 mG/acetaminophen 325 mG 2Tablet(s) Oral every 6 hours PRN  morphine  - Injectable 2milliGRAM(s) IntraMuscular every 4 hours PRN  levETIRAcetam 500milliGRAM(s) Oral every 12 hours  dexamethasone  Injectable 4milliGRAM(s) IV Push every 6 hours  ondansetron Injectable 4milliGRAM(s) IV Push every 6 hours PRN  pantoprazole  Injectable 40milliGRAM(s) IV Push daily  docusate sodium 100milliGRAM(s) Oral three times a day  nafcillin  IVPB  IV Intermittent   enoxaparin Injectable 40milliGRAM(s) SubCutaneous at bedtime  senna 2Tablet(s) Oral at bedtime PRN  nafcillin  IVPB 1Gram(s) IV Intermittent every 4 hours      PHYSICAL EXAM:  General:NAD  Neurological: Awake, alert; ox1; mildly confused; pupils=; EOMI; face=; no neglect; motor 5/5 no drift  Lungs:clear  Heart:regular  Abdomen:soft  Extremities: no edema  Skin:no rash      LABS:                        16.2   11.5  )-----------( 264      ( 19 Jun 2017 22:16 )             46.3    06-19    144  |  105  |  14  ----------------------------<  122<H>  4.1   |  22  |  1.01    Ca    8.7      19 Jun 2017 22:17  Phos  3.8     06-19  Mg     2.3     06-19

## 2017-06-21 DIAGNOSIS — Z90.49 ACQUIRED ABSENCE OF OTHER SPECIFIED PARTS OF DIGESTIVE TRACT: Chronic | ICD-10-CM

## 2017-06-21 PROCEDURE — 70450 CT HEAD/BRAIN W/O DYE: CPT | Mod: 26

## 2017-06-21 RX ORDER — ENOXAPARIN SODIUM 100 MG/ML
40 INJECTION SUBCUTANEOUS
Qty: 0 | Refills: 0 | Status: DISCONTINUED | OUTPATIENT
Start: 2017-06-21 | End: 2017-06-22

## 2017-06-21 RX ADMIN — LEVETIRACETAM 500 MILLIGRAM(S): 250 TABLET, FILM COATED ORAL at 18:03

## 2017-06-21 RX ADMIN — ENOXAPARIN SODIUM 40 MILLIGRAM(S): 100 INJECTION SUBCUTANEOUS at 18:03

## 2017-06-21 RX ADMIN — Medication 100 MILLIGRAM(S): at 06:03

## 2017-06-21 RX ADMIN — NAFCILLIN 100 GRAM(S): 10 INJECTION, POWDER, FOR SOLUTION INTRAVENOUS at 09:43

## 2017-06-21 RX ADMIN — Medication 4 MILLIGRAM(S): at 23:03

## 2017-06-21 RX ADMIN — NAFCILLIN 100 GRAM(S): 10 INJECTION, POWDER, FOR SOLUTION INTRAVENOUS at 06:01

## 2017-06-21 RX ADMIN — PANTOPRAZOLE SODIUM 40 MILLIGRAM(S): 20 TABLET, DELAYED RELEASE ORAL at 11:37

## 2017-06-21 RX ADMIN — Medication 4 MILLIGRAM(S): at 11:37

## 2017-06-21 RX ADMIN — Medication 4 MILLIGRAM(S): at 18:03

## 2017-06-21 RX ADMIN — Medication 4 MILLIGRAM(S): at 06:02

## 2017-06-21 RX ADMIN — Medication 100 MILLIGRAM(S): at 23:03

## 2017-06-21 RX ADMIN — NAFCILLIN 100 GRAM(S): 10 INJECTION, POWDER, FOR SOLUTION INTRAVENOUS at 02:16

## 2017-06-21 RX ADMIN — LEVETIRACETAM 500 MILLIGRAM(S): 250 TABLET, FILM COATED ORAL at 06:01

## 2017-06-21 RX ADMIN — Medication 4 MILLIGRAM(S): at 00:51

## 2017-06-21 NOTE — PHYSICAL THERAPY INITIAL EVALUATION ADULT - PERTINENT HX OF CURRENT PROBLEM, REHAB EVAL
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 Pt symptoms seemed to worsen in the past few days, and he was let go from his job due to forgetfulness this week.
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 Pt symptoms seemed to worsen in the past few days, and he was let go from his job due to forgetfulness this week.

## 2017-06-21 NOTE — OCCUPATIONAL THERAPY INITIAL EVALUATION ADULT - PERTINENT HX OF CURRENT PROBLEM, REHAB EVAL
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 Per wife, pt seemed "off" for a few months, but atributed it to new night shift job. Symptoms seemed to worsen in the past few days, and he was let go from his job due to forgetfulness this week. He denies any HA, vision change, nausea/vomiting, focal weakness, seizures, and gait abnormality
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 Per wife, pt seemed "off" for a few months, but atributed it to new night shift job. Symptoms seemed to worsen in the past few days, and he was let go from his job due to forgetfulness this week. He denies any HA, vision change, nausea/vomiting, focal weakness, seizures, and gait abnormality

## 2017-06-21 NOTE — PHYSICAL THERAPY INITIAL EVALUATION ADULT - PERSONAL SAFETY AND JUDGMENT, REHAB EVAL
impaired short term memory/at risk behaviors demonstrated
impaired short term memory/at risk behaviors demonstrated

## 2017-06-21 NOTE — PHYSICAL THERAPY INITIAL EVALUATION ADULT - RANGE OF MOTION EXAMINATION, REHAB EVAL
bilateral lower extremity ROM was WFL (within functional limits)/bilateral upper extremity ROM was WFL (within functional limits)
bilateral lower extremity ROM was WFL (within functional limits)/bilateral upper extremity ROM was WFL (within functional limits)

## 2017-06-21 NOTE — OCCUPATIONAL THERAPY INITIAL EVALUATION ADULT - ORIENTATION, REHAB EVAL
person/time/States place as "[facility]," states year correctly, unable to state month/date/place

## 2017-06-21 NOTE — OCCUPATIONAL THERAPY INITIAL EVALUATION ADULT - DIAGNOSIS, OT EVAL
Decreased ADL/IADL, functional mobility, transfers, balance, and strength
Pt with impaired cognition and balance impacting pt's ability to complete ADLs, IADLs, functional mobility, drive, work.

## 2017-06-21 NOTE — OCCUPATIONAL THERAPY INITIAL EVALUATION ADULT - GENERAL OBSERVATIONS, REHAB EVAL
Pt found semi-supine in bed, +IVL.
Pt received semisupine in bed, +IV lock, step daughter at bedside

## 2017-06-21 NOTE — OCCUPATIONAL THERAPY INITIAL EVALUATION ADULT - PERSONAL SAFETY AND JUDGMENT, REHAB EVAL
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
Questionable personal safety deficits 2* decreased short term memory

## 2017-06-21 NOTE — PROGRESS NOTE ADULT - SUBJECTIVE AND OBJECTIVE BOX
SUBJECTIVE: Patient seen and examined.  Denies any complaints.     CHIEF COMPLAINT: Confusion w/ memory loss and R frontal mass on CTH    OVERNIGHT EVENTS: agitated, impulsive at times, on 1:1 for observation    Vital Signs Last 24 Hrs  T(C): 36.8, Max: 37 ( @ 11:00)  T(F): 98.2, Max: 98.6 ( @ 11:00)  HR: 77 (67 - 89)  BP: 113/69 (110/72 - 130/79)  BP(mean): 87 (85 - 96)  RR: 16 (15 - 21)  SpO2: 96% (95% - 100%)    PHYSICAL EXAM:    General: No Acute Distress     Neurological: Alert and oriented to self and place, month "February"; EOMI, FC, JACQUES  Fluent speech w/ intact naming and repetition; unable to recall any of three objects after short interval  Face symmetric, tongue midline, no drift    Pulmonary: Clear to Auscultation, No Rales, No Rhonchi, No Wheezes     Cardiovascular: S1, S2, Regular Rate and Rhythm     Gastrointestinal: Soft, Nontender, Nondistended     Extremities: No calf tenderness     Incision: +staples, clean and dry    LABS:                        16.2   11.5  )-----------( 264      ( 2017 22:16 )             46.3        144  |  105  |  14  ----------------------------<  122<H>  4.1   |  22  |  1.01    Ca    8.7      2017 22:17  Phos  3.8       Mg     2.3             I & Os for 24h ending  @ 07:00  =============================================  IN: 2220 ml / OUT: 1500 ml / NET: 720 ml    I & Os for current day (as of  @ 09:21)  =============================================  IN: 0 ml / OUT: 450 ml / NET: -450 ml    DRAINS:     MEDICATIONS:  Antibiotics:  nafcillin  IVPB  IV Intermittent   nafcillin  IVPB 1Gram(s) IV Intermittent every 4 hours    Neuro:  acetaminophen   Tablet 650milliGRAM(s) Oral every 6 hours PRN For Temp greater than 38 C (100.4 F)  acetaminophen   Tablet. 650milliGRAM(s) Oral every 6 hours PRN Mild Pain (1 - 3)  oxyCODONE  5 mG/acetaminophen 325 mG 1Tablet(s) Oral every 4 hours PRN Moderate Pain  oxyCODONE  5 mG/acetaminophen 325 mG 2Tablet(s) Oral every 6 hours PRN Severe Pain  morphine  - Injectable 2milliGRAM(s) IntraMuscular every 4 hours PRN breakthrough pain  levETIRAcetam 500milliGRAM(s) Oral every 12 hours  ondansetron Injectable 4milliGRAM(s) IV Push every 6 hours PRN Nausea and/or Vomiting    Cardiac:    Pulm:    GI/:  pantoprazole  Injectable 40milliGRAM(s) IV Push daily  docusate sodium 100milliGRAM(s) Oral three times a day  senna 2Tablet(s) Oral at bedtime PRN Constipation    Other:   dexamethasone     Tablet 4milliGRAM(s) Oral every 6 hours    DIET: [] Regular [] CCD [] Renal [] Puree [] Dysphagia [] Tube Feeds:     IMAGIN/20 8am: CT Head: Status post interval biopsy with small acute hemorrhage   located within the right inferior frontal component of the bifrontal   neoplasm.     4pm CT Head: Small amount of air is   identified in the left frontal lobe. There is minimal amount of   hemorrhage in the interhemispheric fissure. Hemorrhagic mass is   identified in the inferior right frontal lobe unchanged.

## 2017-06-21 NOTE — PHYSICAL THERAPY INITIAL EVALUATION ADULT - DIAGNOSIS, PT EVAL
decreased functional mobility secondary to generalized weakness, impaired cognition
decreased functional mobility secondary to impaired cognition

## 2017-06-21 NOTE — PHYSICAL THERAPY INITIAL EVALUATION ADULT - FOLLOWS COMMANDS/ANSWERS QUESTIONS, REHAB EVAL
able to follow single-step instructions/100% of the time
100% of the time/able to follow single-step instructions

## 2017-06-21 NOTE — PHYSICAL THERAPY INITIAL EVALUATION ADULT - ADDITIONAL COMMENTS
He denies any HA, vision change, nausea/vomiting, focal weakness, seizures, and gait abnormality and is full strength on exam w/ intact speech and repetition. 6/16 MRI head: A large infiltrating and partially necrotic mass involves both frontal lobes and corpus callosum with enhancing and nonenhancing components as described. Leading considerations include glioblastoma multiforme versus CNS lymphoma.

## 2017-06-21 NOTE — PHYSICAL THERAPY INITIAL EVALUATION ADULT - GENERAL OBSERVATIONS, REHAB EVAL
Pt received supine in bed, A&Ox2-3, agreeable to session, paco 30 min.
Pt received supine in bed, A&Ox1 agreeable to session, family at b/s

## 2017-06-21 NOTE — PHYSICAL THERAPY INITIAL EVALUATION ADULT - LIVES WITH, PROFILE
children/Prior to arrival patient lives in a house with wife and step son, independent with adl's and does not use any DME. Patient has 4 steps with [employer] (wide) to enter and bedroom on second floor with availability on first./spouse

## 2017-06-21 NOTE — OCCUPATIONAL THERAPY INITIAL EVALUATION ADULT - ADDITIONAL COMMENTS
Pt s/p left betsey hole craniectomy for biopsy of brain lesion 6/19/17.  CT Head 6/21: No evidence of increased hemorrhage surrounding the left frontal lobe biopsy site. Residual hemorrhagic right frontal lobe mass and small amount of interhemispheric blood.  MRI Head 6/16: A large infiltrating and partially necrotic mass involves both frontal lobes and corpus callosum with enhancing and nonenhancing components as described. Leading considerations include glioblastoma multiforme versus CNS lymphoma.
CT abdomen and chest 6/15: No evidence of intrathoracic or intra-abdominal malignancy. EKG 6/15: negative. MRI head 6/15: Lg infiltrating and partially necrotic mass involves both frontal lobes and corpus callosum w/ enhancing and nonenhancing components.

## 2017-06-21 NOTE — OCCUPATIONAL THERAPY INITIAL EVALUATION ADULT - MD ORDER
OT Re-Evaluate and Treat  Increase Activity as Tolerated
OT Evaluation  Ambulate as Tolerated   Elevate HOB at 30 degrees

## 2017-06-21 NOTE — OCCUPATIONAL THERAPY INITIAL EVALUATION ADULT - LIVES WITH, PROFILE
spouse/children/Pt unable to provide fully accurate social history, as per CM, lives in a house with wife and step son, independent with adl's and does not use any DME. Patient has 4 steps with [employer] (wide) to enter and bedroom on second floor with availability on first. Pt states he lives in a condo with his 2 step children.

## 2017-06-21 NOTE — PROGRESS NOTE ADULT - ASSESSMENT
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 His symptoms seemed to worsen in the past few days, and he was let go from his job due to forgetfulness this week. He denies any HA, vision change, nausea/vomiting, focal weakness, seizures, and gait abnormality.    PROCEDURE: s/p [place] hole craniectomy for brain biopsy     POD# 2    PLAN:  NEURO: POD 1 from left frontal betsey hole and biopsy of lesion-frozen c/w high grade glioma. Neurologically at preoperative baseline. CT shows small right frontal hemorrhage and post operative changes.   Cont decadron, Keppra, pain control.  Rpt CT brain today  PULM: incentive spirometry  CV: keep SBP<160, MAP>65  ENDO: euglycemic, ck labs in am  HEME/ONC:    Oncology, Radiation called           DVT ppx: [x] SQL [] SQH [] Venodynes bilaterally   RENAL: IVL  ID: [email or web address]  GI: advance diet, colace, senna, protonix  DISCHARGE PLANNING: PT-p  Hospitalist to follow on floor  Tx observation room

## 2017-06-22 VITALS
HEART RATE: 80 BPM | TEMPERATURE: 98 F | DIASTOLIC BLOOD PRESSURE: 73 MMHG | SYSTOLIC BLOOD PRESSURE: 113 MMHG | RESPIRATION RATE: 18 BRPM | OXYGEN SATURATION: 96 %

## 2017-06-22 LAB
ANION GAP SERPL CALC-SCNC: 18 MMOL/L — HIGH (ref 5–17)
BUN SERPL-MCNC: 19 MG/DL — SIGNIFICANT CHANGE UP (ref 7–23)
CALCIUM SERPL-MCNC: 8.9 MG/DL — SIGNIFICANT CHANGE UP (ref 8.4–10.5)
CHLORIDE SERPL-SCNC: 105 MMOL/L — SIGNIFICANT CHANGE UP (ref 96–108)
CO2 SERPL-SCNC: 20 MMOL/L — LOW (ref 22–31)
CREAT SERPL-MCNC: 0.92 MG/DL — SIGNIFICANT CHANGE UP (ref 0.5–1.3)
GLUCOSE SERPL-MCNC: 145 MG/DL — HIGH (ref 70–99)
HCT VFR BLD CALC: 44.3 % — SIGNIFICANT CHANGE UP (ref 39–50)
HGB BLD-MCNC: 14.7 G/DL — SIGNIFICANT CHANGE UP (ref 13–17)
MCHC RBC-ENTMCNC: 29.2 PG — SIGNIFICANT CHANGE UP (ref 27–34)
MCHC RBC-ENTMCNC: 33.2 GM/DL — SIGNIFICANT CHANGE UP (ref 32–36)
MCV RBC AUTO: 88.1 FL — SIGNIFICANT CHANGE UP (ref 80–100)
PLATELET # BLD AUTO: 321 K/UL — SIGNIFICANT CHANGE UP (ref 150–400)
POTASSIUM SERPL-MCNC: 4 MMOL/L — SIGNIFICANT CHANGE UP (ref 3.5–5.3)
POTASSIUM SERPL-SCNC: 4 MMOL/L — SIGNIFICANT CHANGE UP (ref 3.5–5.3)
RBC # BLD: 5.03 M/UL — SIGNIFICANT CHANGE UP (ref 4.2–5.8)
RBC # FLD: 13.7 % — SIGNIFICANT CHANGE UP (ref 10.3–14.5)
SODIUM SERPL-SCNC: 143 MMOL/L — SIGNIFICANT CHANGE UP (ref 135–145)
WBC # BLD: 14.42 K/UL — HIGH (ref 3.8–10.5)
WBC # FLD AUTO: 14.42 K/UL — HIGH (ref 3.8–10.5)

## 2017-06-22 PROCEDURE — 71260 CT THORAX DX C+: CPT

## 2017-06-22 PROCEDURE — 85027 COMPLETE CBC AUTOMATED: CPT

## 2017-06-22 PROCEDURE — 70552 MRI BRAIN STEM W/DYE: CPT

## 2017-06-22 PROCEDURE — 88331 PATH CONSLTJ SURG 1 BLK 1SPC: CPT

## 2017-06-22 PROCEDURE — 88360 TUMOR IMMUNOHISTOCHEM/MANUAL: CPT

## 2017-06-22 PROCEDURE — 88342 IMHCHEM/IMCYTCHM 1ST ANTB: CPT

## 2017-06-22 PROCEDURE — 85730 THROMBOPLASTIN TIME PARTIAL: CPT

## 2017-06-22 PROCEDURE — 85610 PROTHROMBIN TIME: CPT

## 2017-06-22 PROCEDURE — G0515: CPT

## 2017-06-22 PROCEDURE — 80048 BASIC METABOLIC PNL TOTAL CA: CPT

## 2017-06-22 PROCEDURE — 86850 RBC ANTIBODY SCREEN: CPT

## 2017-06-22 PROCEDURE — 88334 PATH CONSLTJ SURG CYTO XM EA: CPT

## 2017-06-22 PROCEDURE — 74018 RADEX ABDOMEN 1 VIEW: CPT

## 2017-06-22 PROCEDURE — 86900 BLOOD TYPING SEROLOGIC ABO: CPT

## 2017-06-22 PROCEDURE — 97161 PT EVAL LOW COMPLEX 20 MIN: CPT

## 2017-06-22 PROCEDURE — 86901 BLOOD TYPING SEROLOGIC RH(D): CPT

## 2017-06-22 PROCEDURE — 99232 SBSQ HOSP IP/OBS MODERATE 35: CPT

## 2017-06-22 PROCEDURE — C1713: CPT

## 2017-06-22 PROCEDURE — 99285 EMERGENCY DEPT VISIT HI MDM: CPT | Mod: 25

## 2017-06-22 PROCEDURE — 97530 THERAPEUTIC ACTIVITIES: CPT

## 2017-06-22 PROCEDURE — 74177 CT ABD & PELVIS W/CONTRAST: CPT

## 2017-06-22 PROCEDURE — 97165 OT EVAL LOW COMPLEX 30 MIN: CPT

## 2017-06-22 PROCEDURE — 88341 IMHCHEM/IMCYTCHM EA ADD ANTB: CPT

## 2017-06-22 PROCEDURE — 80053 COMPREHEN METABOLIC PANEL: CPT

## 2017-06-22 PROCEDURE — 88333 PATH CONSLTJ SURG CYTO XM 1: CPT

## 2017-06-22 PROCEDURE — 93005 ELECTROCARDIOGRAM TRACING: CPT

## 2017-06-22 PROCEDURE — 84100 ASSAY OF PHOSPHORUS: CPT

## 2017-06-22 PROCEDURE — A9585: CPT

## 2017-06-22 PROCEDURE — 83735 ASSAY OF MAGNESIUM: CPT

## 2017-06-22 PROCEDURE — 97116 GAIT TRAINING THERAPY: CPT

## 2017-06-22 PROCEDURE — 97164 PT RE-EVAL EST PLAN CARE: CPT

## 2017-06-22 PROCEDURE — 70450 CT HEAD/BRAIN W/O DYE: CPT

## 2017-06-22 PROCEDURE — C1889: CPT

## 2017-06-22 PROCEDURE — 88307 TISSUE EXAM BY PATHOLOGIST: CPT

## 2017-06-22 RX ORDER — ACETAMINOPHEN 500 MG
2 TABLET ORAL
Qty: 0 | Refills: 0 | COMMUNITY
Start: 2017-06-22

## 2017-06-22 RX ORDER — DEXAMETHASONE 0.5 MG/5ML
2 ELIXIR ORAL
Qty: 60 | Refills: 0 | OUTPATIENT
Start: 2017-06-22

## 2017-06-22 RX ORDER — OXYCODONE HYDROCHLORIDE 5 MG/1
1 TABLET ORAL
Qty: 42 | Refills: 0 | OUTPATIENT
Start: 2017-06-22 | End: 2017-06-29

## 2017-06-22 RX ORDER — INSULIN LISPRO 100/ML
VIAL (ML) SUBCUTANEOUS
Qty: 0 | Refills: 0 | Status: DISCONTINUED | OUTPATIENT
Start: 2017-06-22 | End: 2017-06-22

## 2017-06-22 RX ORDER — LEVETIRACETAM 250 MG/1
1 TABLET, FILM COATED ORAL
Qty: 60 | Refills: 0 | OUTPATIENT
Start: 2017-06-22 | End: 2017-07-22

## 2017-06-22 RX ORDER — SENNA PLUS 8.6 MG/1
2 TABLET ORAL
Qty: 0 | Refills: 0 | COMMUNITY
Start: 2017-06-22

## 2017-06-22 RX ORDER — DOCUSATE SODIUM 100 MG
1 CAPSULE ORAL
Qty: 0 | Refills: 0 | COMMUNITY
Start: 2017-06-22

## 2017-06-22 RX ADMIN — Medication 100 MILLIGRAM(S): at 13:40

## 2017-06-22 RX ADMIN — LEVETIRACETAM 500 MILLIGRAM(S): 250 TABLET, FILM COATED ORAL at 05:15

## 2017-06-22 RX ADMIN — Medication 4 MILLIGRAM(S): at 11:40

## 2017-06-22 RX ADMIN — Medication 100 MILLIGRAM(S): at 05:15

## 2017-06-22 RX ADMIN — Medication 4 MILLIGRAM(S): at 05:15

## 2017-06-22 RX ADMIN — PANTOPRAZOLE SODIUM 40 MILLIGRAM(S): 20 TABLET, DELAYED RELEASE ORAL at 11:40

## 2017-06-22 NOTE — DISCHARGE NOTE ADULT - CARE PROVIDER_API CALL
Everette Cuba), Neurological Surgery  [address] [place] [address]  Phone: [phone]  Fax: [phone]

## 2017-06-22 NOTE — DISCHARGE NOTE ADULT - NS AS DC STROKE ED MATERIALS
Stroke Warning Signs and Symptoms/Prescribed Medications/Risk Factors for Stroke/Call 911 for Stroke/Need for Followup After Discharge/Stroke Education Booklet

## 2017-06-22 NOTE — DISCHARGE NOTE ADULT - PLAN OF CARE
Rehab Call Neurosurgery Dr IWONA Cuba for appointment in 1 week for wound check and staple removal.  Call Oncology at [facility] for Appointment [phone] within 3 days  Followup with your Private MD in 1-2 weeks.  Return to Emergency Department or contact your Neurosurgeon if any changes in mental status, weakness, numbness or tingling of extremities; difficulty swallowing; drainage or redness of wound, fever; pain in legs; difficulty urinating or constipation.  Donot restart your Aspirin or take any Motrin/NSAIDS until checking with your Neurosurgeon. No strenous activity. No heavy lifting. Do not return to work until cleared by physician. No driving until cleared by physician.  You may shower on the 3rd day after you surgery.  No soaking in tub,  Donot apply any ointements to incision. No strenous activity. No heavy lifting. Do not return to work until cleared by physician. No driving until cleared by physician.  You may shower on the 3rd day after you surgery.  No soaking in tub,  Donot apply any ointements to the incision  Script given for outpatient TBI  Someone must be with patient 24hr a day with close supervision Call Neurosurgery Dr IWONA Cuba for appointment in 1 week for wound check and staple removal.  Call Oncology at [facility] for Appointment [phone] within 3 days  Followup with your Private MD in 1-2 weeks.  Return to Emergency Department or contact your Neurosurgeon if any changes in mental status, weakness, numbness or tingling of extremities; difficulty swallowing; drainage or redness of wound, fever; pain in legs; difficulty urinating or constipation.  Donot restart your Aspirin or take any Motrin/NSAIDS until checking with your Neurosurgeon. No strenous activity. No heavy lifting. Do not return to work until cleared by physician. No driving until cleared by physician.  You may shower on the 3rd day after you surgery.  No soaking in tub,  Donot apply any ointements to the incision  Script given for outpatient TBI  Someone must be with patient 24hr a day with close supervision at all times

## 2017-06-22 NOTE — DISCHARGE NOTE ADULT - PATIENT PORTAL LINK FT
“You can access the FollowHealth Patient Portal, offered by [facility], by registering with the following website: [email or web address]”

## 2017-06-22 NOTE — PROGRESS NOTE ADULT - ASSESSMENT
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 His symptoms seemed to worsen in the past few days, and he was let go from his job due to forgetfulness this week. He denies any HA, vision change, nausea/vomiting, focal weakness, seizures, and gait abnormality.    PROCEDURE: Adm 6/15. 6/19 s/p [place] hole craniectomy for brain biopsy-frozen path HG cleve     POD# 3    PLAN:  NEURO: DC Home today, FU path outpt. Cont decadron taper and keep on 2mg BID, Keppra. FU oncology cons-p, FU outpt at [facility]. 1:1 observartion       DVT ppx: [x] SQL [] SQH [X] Venodynes bilaterally     Dispo-PT/OT-Home with 24hr supervision from family, Outpt TBI

## 2017-06-22 NOTE — PROGRESS NOTE ADULT - PROVIDER SPECIALTY LIST ADULT
Hospitalist
NSICU
Neurosurgery
Trauma Surgery
Neurosurgery
Neurosurgery

## 2017-06-22 NOTE — DISCHARGE NOTE ADULT - HOSPITAL COURSE
The patient was admitted on 6/15/2017 and taken to the OR 6/19/2017.  Postop course was uneventful.  The patient was on Nafcillin, SQL, Keppra and Decadron for routine postop management. He will be DC on Decadron with taper over 1 week then cont 2mg BID. His Frozen path=HG Ayo and he should call the [facility] for Followup.    The patient was evaluated by PT/OT and recommended outpt TBI and close  family monitoring/support 24hrs/day.   Celio was subsequently DC on 6/22/2017 in stable condition. The patient was admitted on 6/15/2017 and taken to the OR 6/19/2017.  Postop course was uneventful.  The patient was on Nafcillin, SQL, Keppra and Decadron for routine postop management. He will be DC on Decadron with taper over 1 week then cont 2mg BID. His Frozen path=HG Ayo and he should call the [facility] for Followup.    The patient was evaluated by PT/OT and recommended outpt TBI and close  family monitoring/support 24hrs/day.   He was subsequently DC on 6/22/2017 in stable condition.

## 2017-06-22 NOTE — DISCHARGE NOTE ADULT - REASON FOR ADMISSION
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 His symptoms seemed to worsen in the past few days, and he was let go from his job due to forgetfulness this week. He denies any HA, vision change, nausea/vomiting, focal weakness, seizrues, and gait abnormality and is full strength on exam w/ intact speech and repetition.

## 2017-06-22 NOTE — DISCHARGE NOTE ADULT - NS AS ACTIVITY OBS
Bathing allowed/Walking-Outdoors allowed/Walking-Indoors allowed/Do not drive or operate machinery/Do not make important decisions/No Heavy lifting/straining/Showering allowed/Stairs allowed

## 2017-06-22 NOTE — DISCHARGE NOTE ADULT - MEDICATION SUMMARY - MEDICATIONS TO TAKE
I will START or STAY ON the medications listed below when I get home from the hospital:    Outpatient TBI  -- S/P Lt frontal betsey hole crani for Brain Biopsy  -- Indication: For TBI rehab    dexamethasone 2 mg oral tablet  -- 2 tab(s) by mouth every 6 hours x 8 doses, then 2tabs every 8hrs x 6 doses, then 2 tabs twice a day  -- It is very important that you take or use this exactly as directed.  Do not skip doses or discontinue unless directed by your doctor.  Obtain medical advice before taking any non-prescription drugs as some may affect the action of this medication.  Take with food or milk.    -- Indication: For Brain Swelling    acetaminophen-oxycodone 325 mg-5 mg oral tablet  -- 1 to 2tab(s) by mouth every 4 hours, As Needed, Moderate to severe Pain MDD:5  -- Indication: For Moderate to sever pain    acetaminophen 325 mg oral tablet  -- 2 tab(s) by mouth every 6 hours, As needed, For Temp greater than 38 C (100.4 F)  -- Indication: For Fever    acetaminophen 325 mg oral tablet  -- 2 tab(s) by mouth every 6 hours, As needed, Mild Pain (1 - 3)  -- Indication: For mild pain    levETIRAcetam 500 mg oral tablet  -- 1 tab(s) by mouth every 12 hours  -- Indication: For Seizure prophylaxis    docusate sodium 100 mg oral capsule  -- 1 cap(s) by mouth 3 times a day  -- Indication: For Stool softener    senna oral tablet  -- 2 tab(s) by mouth once a day (at bedtime), As needed, Constipation  -- Indication: For constipation

## 2017-06-22 NOTE — PROGRESS NOTE ADULT - SUBJECTIVE AND OBJECTIVE BOX
SUBJECTIVE: Patient seen and examined. Denies any complaints.     OVERNIGHT EVENTS: impulsive at times, on 1:1 for observation    Vital Signs Last 24 Hrs  T(C): 36.8, Max: 36.9 (06-21 @ 23:26)  T(F): 98.2, Max: 98.4 (06-21 @ 23:26)  HR: 71 (71 - 83)  BP: 121/75 (113/72 - 141/97)  BP(mean): 104 (92 - 104)  RR: 18 (15 - 20)  SpO2: 95% (94% - 98%)  Reg diet  IVL  +Void    PHYSICAL EXAM:    General: No Acute Distress, Pleasantly confused    Neurological: Alert and oriented to self and place-think he is at Rehab, EOMI, FC, JACQUES. Fluent speech w/ intact naming and repetition; unable to recall any of three objects after short interval. Face symmetric, tongue midline, no drift    Pulmonary: Clear to Auscultation, No Rales, No Rhonchi, No Wheezes     Cardiovascular: S1, S2, Regular Rate and Rhythm     Gastrointestinal: Soft, Nontender, Nondistended     Extremities: No calf tenderness     Incision: +staples, clean and dry    LABS:                        14.7   14.42 )-----------( 321      ( 22 Jun 2017 07:21 )             44.3     6/22 Na 143    6/21 CT Brain: Stable exam. No evidence of increased hemorrhage surrounding the left frontal lobe biopsy site. Residual hemorrhagic right frontal lobe mass and small amount of  interhemispheric blood.                I & Os for 24h ending 06-21 @ 07:00  =============================================  IN: 2220 ml / OUT: 1500 ml / NET: 720 ml    I & Os for current day (as of 06-21 @ 09:21)  =============================================  IN: 0 ml / OUT: 450 ml / NET: -450 ml    DRAINS: none    MEDICATIONS:  Antibiotics:  nafcillin  IVPB  IV Intermittent   nafcillin  IVPB 1Gram(s) IV Intermittent every 4 hours    Neuro:  acetaminophen   Tablet 650milliGRAM(s) Oral every 6 hours PRN For Temp greater than 38 C (100.4 F)  acetaminophen   Tablet. 650milliGRAM(s) Oral every 6 hours PRN Mild Pain (1 - 3)  oxyCODONE  5 mG/acetaminophen 325 mG 1Tablet(s) Oral every 4 hours PRN Moderate Pain  oxyCODONE  5 mG/acetaminophen 325 mG 2Tablet(s) Oral every 6 hours PRN Severe Pain  morphine  - Injectable 2milliGRAM(s) IntraMuscular every 4 hours PRN breakthrough pain  levETIRAcetam 500milliGRAM(s) Oral every 12 hours  ondansetron Injectable 4milliGRAM(s) IV Push every 6 hours PRN Nausea and/or Vomiting

## 2017-06-22 NOTE — DISCHARGE NOTE ADULT - CARE PLAN
Principal Discharge DX:	Brain tumor  Goal:	Rehab  Instructions for follow-up, activity and diet:	Call Neurosurgery Dr IWONA Cuba for appointment in 1 week for wound check and staple removal.  Call Oncology at [facility] for Appointment [phone] within 3 days  Followup with your Private MD in 1-2 weeks.  Return to Emergency Department or contact your Neurosurgeon if any changes in mental status, weakness, numbness or tingling of extremities; difficulty swallowing; drainage or redness of wound, fever; pain in legs; difficulty urinating or constipation.  Donot restart your Aspirin or take any Motrin/NSAIDS until checking with your Neurosurgeon.  Instructions for follow-up, activity and diet:	No strenous activity. No heavy lifting. Do not return to work until cleared by physician. No driving until cleared by physician.  You may shower on the 3rd day after you surgery.  No soaking in tub,  Donot apply any ointements to incision. Principal Discharge DX:	Brain tumor  Goal:	Rehab  Instructions for follow-up, activity and diet:	Call Neurosurgery Dr IWONA Cuba for appointment in 1 week for wound check and staple removal.  Call Oncology at [facility] for Appointment [phone] within 3 days  Followup with your Private MD in 1-2 weeks.  Return to Emergency Department or contact your Neurosurgeon if any changes in mental status, weakness, numbness or tingling of extremities; difficulty swallowing; drainage or redness of wound, fever; pain in legs; difficulty urinating or constipation.  Donot restart your Aspirin or take any Motrin/NSAIDS until checking with your Neurosurgeon.  Instructions for follow-up, activity and diet:	No strenous activity. No heavy lifting. Do not return to work until cleared by physician. No driving until cleared by physician.  You may shower on the 3rd day after you surgery.  No soaking in tub,  Donot apply any ointements to the incision  Script given for outpatient TBI  Someone must be with patient 24hr a day with close supervision Principal Discharge DX:	Brain tumor  Goal:	Rehab  Instructions for follow-up, activity and diet:	Call Neurosurgery Dr IWONA Cuba for appointment in 1 week for wound check and staple removal.  Call Oncology at [facility] for Appointment [phone] within 3 days  Followup with your Private MD in 1-2 weeks.  Return to Emergency Department or contact your Neurosurgeon if any changes in mental status, weakness, numbness or tingling of extremities; difficulty swallowing; drainage or redness of wound, fever; pain in legs; difficulty urinating or constipation.  Donot restart your Aspirin or take any Motrin/NSAIDS until checking with your Neurosurgeon.  Instructions for follow-up, activity and diet:	No strenous activity. No heavy lifting. Do not return to work until cleared by physician. No driving until cleared by physician.  You may shower on the 3rd day after you surgery.  No soaking in tub,  Donot apply any ointements to the incision  Script given for outpatient TBI  Someone must be with patient 24hr a day with close supervision Principal Discharge DX:	Brain tumor  Goal:	Rehab  Instructions for follow-up, activity and diet:	Call Neurosurgery Dr IWONA Cuba for appointment in 1 week for wound check and staple removal.  Call Oncology at [facility] for Appointment [phone] within 3 days  Followup with your Private MD in 1-2 weeks.  Return to Emergency Department or contact your Neurosurgeon if any changes in mental status, weakness, numbness or tingling of extremities; difficulty swallowing; drainage or redness of wound, fever; pain in legs; difficulty urinating or constipation.  Donot restart your Aspirin or take any Motrin/NSAIDS until checking with your Neurosurgeon.  Instructions for follow-up, activity and diet:	No strenous activity. No heavy lifting. Do not return to work until cleared by physician. No driving until cleared by physician.  You may shower on the 3rd day after you surgery.  No soaking in tub,  Donot apply any ointements to the incision  Script given for outpatient TBI  Someone must be with patient 24hr a day with close supervision at all times Principal Discharge DX:	Brain tumor  Goal:	Rehab  Instructions for follow-up, activity and diet:	Call Neurosurgery Dr IWONA Cuba for appointment in 1 week for wound check and staple removal.  Call Oncology at [facility] for Appointment [phone] within 3 days  Followup with your Private MD in 1-2 weeks.  Return to Emergency Department or contact your Neurosurgeon if any changes in mental status, weakness, numbness or tingling of extremities; difficulty swallowing; drainage or redness of wound, fever; pain in legs; difficulty urinating or constipation.  Donot restart your Aspirin or take any Motrin/NSAIDS until checking with your Neurosurgeon.  Instructions for follow-up, activity and diet:	No strenous activity. No heavy lifting. Do not return to work until cleared by physician. No driving until cleared by physician.  You may shower on the 3rd day after you surgery.  No soaking in tub,  Donot apply any ointements to the incision  Script given for outpatient TBI  Someone must be with patient 24hr a day with close supervision at all times

## 2017-06-23 PROBLEM — Z00.00 ENCOUNTER FOR PREVENTIVE HEALTH EXAMINATION: Status: ACTIVE | Noted: 2017-06-23

## 2017-06-27 ENCOUNTER — APPOINTMENT (OUTPATIENT)
Dept: NEUROSURGERY | Facility: CLINIC | Age: 57
End: 2017-06-27

## 2017-06-27 VITALS
SYSTOLIC BLOOD PRESSURE: 123 MMHG | TEMPERATURE: 98.3 F | RESPIRATION RATE: 16 BRPM | OXYGEN SATURATION: 99 % | BODY MASS INDEX: 27.47 KG/M2 | WEIGHT: 175 LBS | DIASTOLIC BLOOD PRESSURE: 68 MMHG | HEIGHT: 67 IN | HEART RATE: 76 BPM

## 2017-06-27 DIAGNOSIS — G93.6 CEREBRAL EDEMA: ICD-10-CM

## 2017-06-27 DIAGNOSIS — R41.3 OTHER AMNESIA: ICD-10-CM

## 2017-06-27 DIAGNOSIS — Z98.890 OTHER SPECIFIED POSTPROCEDURAL STATES: ICD-10-CM

## 2017-06-27 DIAGNOSIS — R32 UNSPECIFIED URINARY INCONTINENCE: ICD-10-CM

## 2017-06-27 LAB — SURGICAL PATHOLOGY STUDY: SIGNIFICANT CHANGE UP

## 2017-06-27 RX ORDER — LEVETIRACETAM 500 MG/1
500 TABLET, FILM COATED ORAL TWICE DAILY
Qty: 60 | Refills: 3 | Status: ACTIVE | COMMUNITY
Start: 2017-06-27

## 2017-06-27 RX ORDER — OMEPRAZOLE 20 MG/1
20 TABLET, DELAYED RELEASE ORAL
Refills: 0 | Status: ACTIVE | COMMUNITY
Start: 2017-06-27

## 2017-06-28 ENCOUNTER — OUTPATIENT (OUTPATIENT)
Dept: OUTPATIENT SERVICES | Facility: HOSPITAL | Age: 57
LOS: 1 days | Discharge: HOME | End: 2017-06-28

## 2017-06-28 DIAGNOSIS — Z90.49 ACQUIRED ABSENCE OF OTHER SPECIFIED PARTS OF DIGESTIVE TRACT: Chronic | ICD-10-CM

## 2017-06-29 ENCOUNTER — APPOINTMENT (OUTPATIENT)
Dept: RADIATION ONCOLOGY | Facility: CLINIC | Age: 57
End: 2017-06-29

## 2017-06-29 DIAGNOSIS — Z78.9 OTHER SPECIFIED HEALTH STATUS: ICD-10-CM

## 2017-06-29 DIAGNOSIS — K21.9 GASTRO-ESOPHAGEAL REFLUX DISEASE W/OUT ESOPHAGITIS: ICD-10-CM

## 2017-07-10 ENCOUNTER — OUTPATIENT (OUTPATIENT)
Dept: OUTPATIENT SERVICES | Facility: HOSPITAL | Age: 57
LOS: 1 days | Discharge: HOME | End: 2017-07-10

## 2017-07-10 ENCOUNTER — APPOINTMENT (OUTPATIENT)
Dept: HEMATOLOGY ONCOLOGY | Facility: CLINIC | Age: 57
End: 2017-07-10

## 2017-07-10 ENCOUNTER — APPOINTMENT (OUTPATIENT)
Dept: HEMATOLOGY ONCOLOGY | Facility: CLINIC | Age: 57
End: 2017-07-10
Payer: COMMERCIAL

## 2017-07-10 VITALS
RESPIRATION RATE: 16 BRPM | HEART RATE: 63 BPM | BODY MASS INDEX: 25.99 KG/M2 | HEIGHT: 67.32 IN | WEIGHT: 167.55 LBS | TEMPERATURE: 97.2 F | SYSTOLIC BLOOD PRESSURE: 118 MMHG | DIASTOLIC BLOOD PRESSURE: 78 MMHG | OXYGEN SATURATION: 99 %

## 2017-07-10 DIAGNOSIS — F99 MENTAL DISORDER, NOT OTHERWISE SPECIFIED: ICD-10-CM

## 2017-07-10 DIAGNOSIS — Z87.440 PERSONAL HISTORY OF URINARY (TRACT) INFECTIONS: ICD-10-CM

## 2017-07-10 DIAGNOSIS — Z80.42 FAMILY HISTORY OF MALIGNANT NEOPLASM OF PROSTATE: ICD-10-CM

## 2017-07-10 DIAGNOSIS — R11.2 NAUSEA WITH VOMITING, UNSPECIFIED: ICD-10-CM

## 2017-07-10 DIAGNOSIS — Z90.49 ACQUIRED ABSENCE OF OTHER SPECIFIED PARTS OF DIGESTIVE TRACT: Chronic | ICD-10-CM

## 2017-07-10 DIAGNOSIS — T45.1X5A NAUSEA WITH VOMITING, UNSPECIFIED: ICD-10-CM

## 2017-07-10 DIAGNOSIS — C71.9 MALIGNANT NEOPLASM OF BRAIN, UNSPECIFIED: ICD-10-CM

## 2017-07-10 PROCEDURE — 99205 OFFICE O/P NEW HI 60 MIN: CPT

## 2017-07-10 RX ORDER — ONDANSETRON 8 MG/1
8 TABLET ORAL DAILY
Qty: 30 | Refills: 1 | Status: ACTIVE | COMMUNITY
Start: 2017-07-10 | End: 1900-01-01

## 2017-07-10 RX ORDER — TEMOZOLOMIDE 140 MG/1
140 CAPSULE ORAL DAILY
Qty: 21 | Refills: 1 | Status: ACTIVE | COMMUNITY
Start: 2017-07-10 | End: 1900-01-01

## 2017-07-10 RX ORDER — SULFAMETHOXAZOLE AND TRIMETHOPRIM 800; 160 MG/1; MG/1
800-160 TABLET ORAL
Qty: 12 | Refills: 6 | Status: ACTIVE | COMMUNITY
Start: 2017-07-10 | End: 1900-01-01

## 2017-07-10 RX ORDER — CIPROFLOXACIN HYDROCHLORIDE 500 MG/1
500 TABLET, FILM COATED ORAL
Qty: 10 | Refills: 0 | Status: ACTIVE | COMMUNITY
Start: 2017-07-10

## 2017-07-25 ENCOUNTER — APPOINTMENT (OUTPATIENT)
Dept: HEMATOLOGY ONCOLOGY | Facility: CLINIC | Age: 57
End: 2017-07-25

## 2017-07-25 ENCOUNTER — RESULT REVIEW (OUTPATIENT)
Age: 57
End: 2017-07-25

## 2017-07-25 VITALS
TEMPERATURE: 97.8 F | RESPIRATION RATE: 16 BRPM | HEART RATE: 77 BPM | BODY MASS INDEX: 25.65 KG/M2 | OXYGEN SATURATION: 97 % | WEIGHT: 165.35 LBS | DIASTOLIC BLOOD PRESSURE: 70 MMHG | SYSTOLIC BLOOD PRESSURE: 112 MMHG

## 2017-07-25 LAB
HCT VFR BLD CALC: 45.3 % — SIGNIFICANT CHANGE UP (ref 39–50)
HGB BLD-MCNC: 15.6 G/DL — SIGNIFICANT CHANGE UP (ref 13–17)
MCHC RBC-ENTMCNC: 30.5 PG — SIGNIFICANT CHANGE UP (ref 27–34)
MCHC RBC-ENTMCNC: 34.4 G/DL — SIGNIFICANT CHANGE UP (ref 32–36)
MCV RBC AUTO: 88.5 FL — SIGNIFICANT CHANGE UP (ref 80–100)
PLATELET # BLD AUTO: 227 K/UL — SIGNIFICANT CHANGE UP (ref 150–400)
RBC # BLD: 5.11 M/UL — SIGNIFICANT CHANGE UP (ref 4.2–5.8)
RBC # FLD: 14.3 % — SIGNIFICANT CHANGE UP (ref 10.3–14.5)
WBC # BLD: 17.5 K/UL — HIGH (ref 3.8–10.5)
WBC # FLD AUTO: 17.5 K/UL — HIGH (ref 3.8–10.5)

## 2017-07-25 RX ORDER — DEXAMETHASONE 2 MG/1
2 TABLET ORAL 3 TIMES DAILY
Qty: 42 | Refills: 1 | Status: ACTIVE | COMMUNITY
Start: 2017-06-27

## 2017-07-26 DIAGNOSIS — C71.1 MALIGNANT NEOPLASM OF FRONTAL LOBE: ICD-10-CM

## 2017-07-26 PROBLEM — R11.2 CHEMOTHERAPY-INDUCED NAUSEA AND VOMITING: Status: ACTIVE | Noted: 2017-07-10

## 2017-07-26 PROBLEM — Z80.42 FAMILY HISTORY OF MALIGNANT NEOPLASM OF PROSTATE: Status: ACTIVE | Noted: 2017-07-10

## 2017-07-26 PROBLEM — F99 CONFUSION AND DISORIENTATION: Status: ACTIVE | Noted: 2017-06-27

## 2017-07-28 ENCOUNTER — OUTPATIENT (OUTPATIENT)
Dept: OUTPATIENT SERVICES | Facility: HOSPITAL | Age: 57
LOS: 1 days | End: 2017-07-28
Payer: COMMERCIAL

## 2017-07-28 ENCOUNTER — RESULT REVIEW (OUTPATIENT)
Age: 57
End: 2017-07-28

## 2017-07-28 DIAGNOSIS — C71.1 MALIGNANT NEOPLASM OF FRONTAL LOBE: ICD-10-CM

## 2017-07-28 DIAGNOSIS — Z90.49 ACQUIRED ABSENCE OF OTHER SPECIFIED PARTS OF DIGESTIVE TRACT: Chronic | ICD-10-CM

## 2017-07-28 PROCEDURE — 88363 XM ARCHIVE TISSUE MOLEC ANAL: CPT

## 2017-07-31 LAB — SURGICAL PATHOLOGY STUDY: SIGNIFICANT CHANGE UP

## 2017-08-15 ENCOUNTER — RX RENEWAL (OUTPATIENT)
Age: 57
End: 2017-08-15

## 2017-08-23 ENCOUNTER — EMERGENCY (EMERGENCY)
Facility: HOSPITAL | Age: 57
LOS: 1 days | End: 2017-08-23
Payer: COMMERCIAL

## 2017-08-23 ENCOUNTER — OUTPATIENT (OUTPATIENT)
Dept: OUTPATIENT SERVICES | Facility: HOSPITAL | Age: 57
LOS: 1 days | Discharge: HOME | End: 2017-08-23

## 2017-08-23 DIAGNOSIS — Z90.49 ACQUIRED ABSENCE OF OTHER SPECIFIED PARTS OF DIGESTIVE TRACT: Chronic | ICD-10-CM

## 2017-08-23 DIAGNOSIS — C71.1 MALIGNANT NEOPLASM OF FRONTAL LOBE: ICD-10-CM

## 2017-08-23 PROCEDURE — 99285 EMERGENCY DEPT VISIT HI MDM: CPT

## 2017-08-23 PROCEDURE — 74177 CT ABD & PELVIS W/CONTRAST: CPT | Mod: 26

## 2017-08-23 PROCEDURE — 70450 CT HEAD/BRAIN W/O DYE: CPT | Mod: 26

## 2017-08-23 PROCEDURE — 93970 EXTREMITY STUDY: CPT | Mod: 26

## 2017-08-23 PROCEDURE — 71010: CPT | Mod: 26

## 2017-08-24 PROBLEM — C71.1 GLIOBLASTOMA OF FRONTAL LOBE: Status: ACTIVE | Noted: 2017-06-27

## 2017-08-28 ENCOUNTER — APPOINTMENT (OUTPATIENT)
Dept: HEMATOLOGY ONCOLOGY | Facility: CLINIC | Age: 57
End: 2017-08-28
Payer: COMMERCIAL

## 2017-08-28 VITALS
WEIGHT: 166.67 LBS | OXYGEN SATURATION: 97 % | SYSTOLIC BLOOD PRESSURE: 130 MMHG | BODY MASS INDEX: 25.85 KG/M2 | HEART RATE: 81 BPM | RESPIRATION RATE: 16 BRPM | TEMPERATURE: 98 F | DIASTOLIC BLOOD PRESSURE: 84 MMHG

## 2017-08-28 DIAGNOSIS — C71.1 MALIGNANT NEOPLASM OF FRONTAL LOBE: ICD-10-CM

## 2017-08-28 PROCEDURE — 99215 OFFICE O/P EST HI 40 MIN: CPT

## 2017-08-28 RX ORDER — OMEPRAZOLE 20 MG/1
20 CAPSULE, DELAYED RELEASE ORAL
Qty: 21 | Refills: 0 | Status: ACTIVE | COMMUNITY
Start: 2017-07-06

## 2020-12-15 PROBLEM — Z87.440 HISTORY OF URINARY TRACT INFECTION: Status: RESOLVED | Noted: 2017-07-10 | Resolved: 2020-12-15

## 2022-07-01 NOTE — PROGRESS NOTE ADULT - SUBJECTIVE AND OBJECTIVE BOX
Interval History:   Feels much better today, walking out of bathroom and also eating food, repeat LP with lower OP    Review of Systems   Constitutional:  Positive for activity change, appetite change and fatigue. Negative for chills, diaphoresis and fever.   HENT:  Positive for hearing loss and voice change. Negative for congestion.    Eyes:  Positive for visual disturbance. Negative for pain.   Respiratory:  Negative for cough, shortness of breath and wheezing.    Cardiovascular:  Negative for chest pain, palpitations and leg swelling.   Gastrointestinal:  Positive for nausea. Negative for constipation, diarrhea and vomiting.   Genitourinary:  Negative for difficulty urinating and dysuria.   Musculoskeletal:  Positive for neck pain. Negative for arthralgias.   Skin:  Negative for rash and wound.   Allergic/Immunologic: Positive for immunocompromised state.   Neurological:  Positive for facial asymmetry, speech difficulty and headaches. Negative for dizziness and seizures.   Psychiatric/Behavioral:  Negative for agitation and behavioral problems.      Objective:     Vital Signs (Most Recent):  Temp: 98.6 °F (37 °C) (07/01/22 1611)  Pulse: 65 (07/01/22 1611)  Resp: 17 (07/01/22 1611)  BP: (!) 146/71 (07/01/22 1611)  SpO2: (!) 93 % (07/01/22 1611)   Vital Signs (24h Range):  Temp:  [97 °F (36.1 °C)-98.6 °F (37 °C)] 98.6 °F (37 °C)  Pulse:  [53-65] 65  Resp:  [17-20] 17  SpO2:  [93 %-98 %] 93 %  BP: (146-179)/(67-80) 146/71     Weight: 71.7 kg (158 lb 1.1 oz)  Body mass index is 30.87 kg/m².    Intake/Output Summary (Last 24 hours) at 7/1/2022 1712  Last data filed at 7/1/2022 0548  Gross per 24 hour   Intake 1450 ml   Output --   Net 1450 ml          Physical Exam  Vitals reviewed.   Constitutional:       General: She is not in acute distress.     Appearance: She is well-developed.   HENT:      Head: Normocephalic and atraumatic.   Eyes:      Extraocular Movements: Extraocular movements intact.      Pupils: Pupils  CC: Patient is a 57y old  Male who presents with a chief complaint of Confusion w/ memory loss and R frontal mass on CTH.    SUBJECTIVE / OVERNIGHT EVENTS: Feels well. Denies any pain, f/c/r, CP/SOB, N/V/D/abd pain, dysuria    MEDICATIONS  (STANDING):  levETIRAcetam 500milliGRAM(s) Oral every 12 hours  pantoprazole  Injectable 40milliGRAM(s) IV Push daily  docusate sodium 100milliGRAM(s) Oral three times a day  dexamethasone     Tablet 4milliGRAM(s) Oral every 6 hours  enoxaparin Injectable 40milliGRAM(s) SubCutaneous <User Schedule>  insulin lispro (HumaLOG) corrective regimen sliding scale  SubCutaneous Before meals and at bedtime    MEDICATIONS  (PRN):  acetaminophen   Tablet 650milliGRAM(s) Oral every 6 hours PRN For Temp greater than 38 C (100.4 F)  acetaminophen   Tablet. 650milliGRAM(s) Oral every 6 hours PRN Mild Pain (1 - 3)  oxyCODONE  5 mG/acetaminophen 325 mG 1Tablet(s) Oral every 4 hours PRN Moderate Pain  oxyCODONE  5 mG/acetaminophen 325 mG 2Tablet(s) Oral every 6 hours PRN Severe Pain  ondansetron Injectable 4milliGRAM(s) IV Push every 6 hours PRN Nausea and/or Vomiting  senna 2Tablet(s) Oral at bedtime PRN Constipation      Vital Signs Last 24 Hrs  T(C): 36.5, Max: 36.9 (06-21 @ 23:26)  HR: 80 (71 - 83)  BP: 113/73 (113/72 - 141/97)  RR: 18 (16 - 20)  SpO2: 96% (94% - 98%)  Wt(kg): --  CAPILLARY BLOOD GLUCOSE    I&O's Summary  I & Os for 24h ending 22 Jun 2017 07:00  =============================================  IN: 2020 ml / OUT: 1175 ml / NET: 845 ml    I & Os for current day (as of 22 Jun 2017 12:57)  =============================================  IN: 120 ml / OUT: 0 ml / NET: 120 ml    tele:    PHYSICAL EXAM:    GENERAL: NAD   HEENT: EOMI, PERRL  PULM: Clear to auscultation bilaterally  CV: Regular rate and rhythm; nl S1, S2; No murmurs, rubs, or gallops  ABDOMEN: Soft, Nontender, Nondistended; Bowel sounds present  EXTREMITIES/MSK:  No edema, calf tenderness   PSYCH: AAOx3  NEUROLOGY: non-focal          LABS:                        14.7   14.42 )-----------( 321      ( 22 Jun 2017 07:21 )             44.3     06-22    143  |  105  |  19  ----------------------------<  145<H>  4.0   |  20<L>  |  0.92    Ca    8.9      22 Jun 2017 07:30                  RADIOLOGY & ADDITIONAL TESTS:    Imaging Personally Reviewed:    Consultant(s) Notes Reviewed:      Care Discussed with Consultants/Other Providers: are equal, round, and reactive to light.   Neck:      Vascular: No JVD.      Trachea: No tracheal deviation.   Cardiovascular:      Rate and Rhythm: Normal rate and regular rhythm.      Heart sounds: No murmur heard.    No friction rub. No gallop.   Pulmonary:      Effort: No respiratory distress.      Breath sounds: Normal breath sounds. No wheezing or rales.   Abdominal:      General: Bowel sounds are normal. There is no distension.      Palpations: Abdomen is soft. There is no mass.      Tenderness: There is no abdominal tenderness.   Musculoskeletal:         General: No deformity.      Cervical back: Neck supple.   Lymphadenopathy:      Cervical: No cervical adenopathy.   Skin:     General: Skin is warm and dry.      Findings: No rash.   Neurological:      Mental Status: She is alert and oriented to person, place, and time.      Cranial Nerves: Facial asymmetry present.   Psychiatric:         Speech: Speech is slurred ((improved)).       Significant Labs: All pertinent labs within the past 24 hours have been reviewed.    Recent Results (from the past 24 hour(s))   PT/INR    Collection Time: 07/01/22  6:17 AM   Result Value Ref Range    Prothrombin Time 14.0 (H) 9.0 - 12.5 sec    INR 1.4 (H) 0.8 - 1.2   CBC Auto Differential    Collection Time: 07/01/22  6:17 AM   Result Value Ref Range    WBC 4.22 3.90 - 12.70 K/uL    RBC 2.96 (L) 4.00 - 5.40 M/uL    Hemoglobin 8.9 (L) 12.0 - 16.0 g/dL    Hematocrit 26.6 (L) 37.0 - 48.5 %    MCV 90 82 - 98 fL    MCH 30.1 27.0 - 31.0 pg    MCHC 33.5 32.0 - 36.0 g/dL    RDW 12.3 11.5 - 14.5 %    Platelets 125 (L) 150 - 450 K/uL    MPV 11.3 9.2 - 12.9 fL    Immature Granulocytes 1.4 (H) 0.0 - 0.5 %    Gran # (ANC) 3.5 1.8 - 7.7 K/uL    Immature Grans (Abs) 0.06 (H) 0.00 - 0.04 K/uL    Lymph # 0.3 (L) 1.0 - 4.8 K/uL    Mono # 0.3 0.3 - 1.0 K/uL    Eos # 0.0 0.0 - 0.5 K/uL    Baso # 0.02 0.00 - 0.20 K/uL    nRBC 0 0 /100 WBC    Gran % 83.4 (H) 38.0 - 73.0 %    Lymph % 6.6 (L) 18.0 -  48.0 %    Mono % 7.6 4.0 - 15.0 %    Eosinophil % 0.5 0.0 - 8.0 %    Basophil % 0.5 0.0 - 1.9 %    Differential Method Automated    Tacrolimus level    Collection Time: 07/01/22  6:17 AM   Result Value Ref Range    Tacrolimus Lvl 3.1 (L) 5.0 - 15.0 ng/mL   Comprehensive metabolic panel    Collection Time: 07/01/22  8:44 AM   Result Value Ref Range    Sodium 134 (L) 136 - 145 mmol/L    Potassium 4.1 3.5 - 5.1 mmol/L    Chloride 107 95 - 110 mmol/L    CO2 20 (L) 23 - 29 mmol/L    Glucose 137 (H) 70 - 110 mg/dL    BUN 16 6 - 20 mg/dL    Creatinine 1.2 0.5 - 1.4 mg/dL    Calcium 8.9 8.7 - 10.5 mg/dL    Total Protein 5.5 (L) 6.0 - 8.4 g/dL    Albumin 3.1 (L) 3.5 - 5.2 g/dL    Total Bilirubin 0.5 0.1 - 1.0 mg/dL    Alkaline Phosphatase 58 55 - 135 U/L    AST 16 10 - 40 U/L    ALT 18 10 - 44 U/L    Anion Gap 7 (L) 8 - 16 mmol/L    eGFR if African American 59.2 (A) >60 mL/min/1.73 m^2    eGFR if non  51.4 (A) >60 mL/min/1.73 m^2   Magnesium    Collection Time: 07/01/22  8:44 AM   Result Value Ref Range    Magnesium 2.2 1.6 - 2.6 mg/dL   Phosphorus    Collection Time: 07/01/22  8:44 AM   Result Value Ref Range    Phosphorus 3.2 2.7 - 4.5 mg/dL         Significant Imaging: I have reviewed all pertinent imaging results/findings within the past 24 hours.         CC: Patient is a 57y old  Male who presents with a chief complaint of Confusion w/ memory loss and R frontal mass on CTH.    SUBJECTIVE / OVERNIGHT EVENTS: Feels well. Denies any pain, f/c/r, CP/SOB, N/V/D/abd pain, dysuria    MEDICATIONS  (STANDING):  levETIRAcetam 500milliGRAM(s) Oral every 12 hours  pantoprazole  Injectable 40milliGRAM(s) IV Push daily  docusate sodium 100milliGRAM(s) Oral three times a day  dexamethasone     Tablet 4milliGRAM(s) Oral every 6 hours  enoxaparin Injectable 40milliGRAM(s) SubCutaneous <User Schedule>  insulin lispro (HumaLOG) corrective regimen sliding scale  SubCutaneous Before meals and at bedtime    MEDICATIONS  (PRN):  acetaminophen   Tablet 650milliGRAM(s) Oral every 6 hours PRN For Temp greater than 38 C (100.4 F)  acetaminophen   Tablet. 650milliGRAM(s) Oral every 6 hours PRN Mild Pain (1 - 3)  oxyCODONE  5 mG/acetaminophen 325 mG 1Tablet(s) Oral every 4 hours PRN Moderate Pain  oxyCODONE  5 mG/acetaminophen 325 mG 2Tablet(s) Oral every 6 hours PRN Severe Pain  ondansetron Injectable 4milliGRAM(s) IV Push every 6 hours PRN Nausea and/or Vomiting  senna 2Tablet(s) Oral at bedtime PRN Constipation      Vital Signs Last 24 Hrs  T(C): 36.5, Max: 36.9 (06-21 @ 23:26)  HR: 80 (71 - 83)  BP: 113/73 (113/72 - 141/97)  RR: 18 (16 - 20)  SpO2: 96% (94% - 98%)  Wt(kg): --  CAPILLARY BLOOD GLUCOSE    I&O's Summary  I & Os for 24h ending 22 Jun 2017 07:00  =============================================  IN: 2020 ml / OUT: 1175 ml / NET: 845 ml    I & Os for current day (as of 22 Jun 2017 12:57)  =============================================  IN: 120 ml / OUT: 0 ml / NET: 120 ml        PHYSICAL EXAM:    GENERAL: NAD   HEENT: EOMI, PERRL  PULM: Clear to auscultation bilaterally  CV: Regular rate and rhythm; nl S1, S2; No murmurs, rubs, or gallops  ABDOMEN: Soft, Nontender, Nondistended; Bowel sounds present  EXTREMITIES/MSK:  No edema, calf tenderness   PSYCH: AAOx2. confused  NEUROLOGY: motor and sensation intact b/l          LABS:                        14.7   14.42 )-----------( 321      ( 22 Jun 2017 07:21 )             44.3     06-22    143  |  105  |  19  ----------------------------<  145<H>  4.0   |  20<L>  |  0.92    Ca    8.9      22 Jun 2017 07:30                  RADIOLOGY & ADDITIONAL TESTS:    Imaging Personally Reviewed:    Consultant(s) Notes Reviewed:      Care Discussed with Consultants/Other Providers: neurosurg

## 2022-10-12 NOTE — PACU DISCHARGE NOTE - THE ANESTHESIA ORDERS USED IN THE PACU ORDER SET WILL BE DISCONTINUED UPON TRANSFER OF THIS PATIENT
Statement Selected I have re-evaluated the patient's fluid status and reviewed vital signs. Clinical perfusion assessment was performed.

## 2022-12-28 NOTE — ANESTHESIA FOLLOW-UP NOTE - NSEVALATIONFT_GEN_ALL_CORE
LVM notifying patient that on Friday, 01/06/23, when she is scheduled for her injection, there will be no nurse in the clinic to give injections that afternoon. Asked for call back to reschedule patient's injection. Will follow up with [employer] message.   pt in ct scan, chart reviewed

## 2023-06-21 NOTE — OCCUPATIONAL THERAPY INITIAL EVALUATION ADULT - PLANNED THERAPY INTERVENTIONS, OT EVAL
interest in learning
IADL retraining/parent/caregiver training.../balance training/ADL retraining/cognitive, visual perceptual
IADL retraining/cognitive, visual perceptual/ADL retraining/balance training

## 2023-10-20 NOTE — PHYSICAL THERAPY INITIAL EVALUATION ADULT - DISCHARGE DISPOSITION, PT EVAL
C3 nurse attempted to contact [employer]  for a [facility] post hospital discharge follow up call. No answer. Left voicemail with callback information. The patient has a scheduled HOSFU appointment with Fernanda Simental MD (Family Medicine) on October 26, 2023 @ 4:00 pm.           
home w/ assist/outpatient TBI rehab
outpatient TBI rehab